# Patient Record
Sex: FEMALE | Race: OTHER | HISPANIC OR LATINO
[De-identification: names, ages, dates, MRNs, and addresses within clinical notes are randomized per-mention and may not be internally consistent; named-entity substitution may affect disease eponyms.]

---

## 2017-01-18 ENCOUNTER — APPOINTMENT (OUTPATIENT)
Dept: PEDIATRIC HEMATOLOGY/ONCOLOGY | Facility: CLINIC | Age: 27
End: 2017-01-18

## 2017-01-18 ENCOUNTER — LABORATORY RESULT (OUTPATIENT)
Age: 27
End: 2017-01-18

## 2017-01-18 VITALS
SYSTOLIC BLOOD PRESSURE: 109 MMHG | DIASTOLIC BLOOD PRESSURE: 74 MMHG | BODY MASS INDEX: 25.98 KG/M2 | HEIGHT: 62.87 IN | HEART RATE: 76 BPM | WEIGHT: 146.61 LBS

## 2017-01-18 DIAGNOSIS — Z86.39 PERSONAL HISTORY OF OTHER ENDOCRINE, NUTRITIONAL AND METABOLIC DISEASE: ICD-10-CM

## 2017-01-20 LAB
25(OH)D3 SERPL-MCNC: 20.6 NG/ML
ALBUMIN SERPL ELPH-MCNC: 4.7 G/DL
ALP BLD-CCNC: 66 U/L
ALT SERPL-CCNC: 15 U/L
ANION GAP SERPL CALC-SCNC: 14 MMOL/L
APPEARANCE: CLEAR
AST SERPL-CCNC: 24 U/L
BASOPHILS # BLD AUTO: 0.01 K/UL
BASOPHILS NFR BLD AUTO: 0.2 %
BILIRUB SERPL-MCNC: 0.5 MG/DL
BILIRUBIN URINE: NEGATIVE
BLOOD URINE: ABNORMAL
BUN SERPL-MCNC: 11 MG/DL
CALCIUM SERPL-MCNC: 9.4 MG/DL
CALCIUM SERPL-MCNC: 9.4 MG/DL
CHLORIDE SERPL-SCNC: 102 MMOL/L
CO2 SERPL-SCNC: 24 MMOL/L
COLOR: YELLOW
CREAT SERPL-MCNC: 0.89 MG/DL
EOSINOPHIL # BLD AUTO: 0.12 K/UL
EOSINOPHIL NFR BLD AUTO: 2.2 %
GLUCOSE QUALITATIVE U: NORMAL MG/DL
GLUCOSE SERPL-MCNC: 88 MG/DL
HCT VFR BLD CALC: 41.2 %
HGB BLD-MCNC: 13.6 G/DL
IMM GRANULOCYTES NFR BLD AUTO: 0.2 %
KETONES URINE: ABNORMAL
LEUKOCYTE ESTERASE URINE: NEGATIVE
LYMPHOCYTES # BLD AUTO: 1.42 K/UL
LYMPHOCYTES NFR BLD AUTO: 26.2 %
MAN DIFF?: NORMAL
MCHC RBC-ENTMCNC: 30 PG
MCHC RBC-ENTMCNC: 33 GM/DL
MCV RBC AUTO: 90.9 FL
MONOCYTES # BLD AUTO: 0.34 K/UL
MONOCYTES NFR BLD AUTO: 6.3 %
NEUTROPHILS # BLD AUTO: 3.53 K/UL
NEUTROPHILS NFR BLD AUTO: 64.9 %
NITRITE URINE: NEGATIVE
PARATHYROID HORMONE INTACT: 68 PG/ML
PH URINE: 6
PLATELET # BLD AUTO: 229 K/UL
POTASSIUM SERPL-SCNC: 3.8 MMOL/L
PROT SERPL-MCNC: 7.9 G/DL
PROTEIN URINE: NEGATIVE MG/DL
RBC # BLD: 4.53 M/UL
RBC # FLD: 13.6 %
SODIUM SERPL-SCNC: 140 MMOL/L
SPECIFIC GRAVITY URINE: 1.01
UROBILINOGEN URINE: NORMAL MG/DL
WBC # FLD AUTO: 5.43 K/UL

## 2017-02-08 ENCOUNTER — TRANSCRIPTION ENCOUNTER (OUTPATIENT)
Age: 27
End: 2017-02-08

## 2017-07-06 ENCOUNTER — APPOINTMENT (OUTPATIENT)
Dept: PSYCHIATRY | Facility: CLINIC | Age: 27
End: 2017-07-06

## 2017-10-20 ENCOUNTER — APPOINTMENT (OUTPATIENT)
Dept: PSYCHIATRY | Facility: CLINIC | Age: 27
End: 2017-10-20
Payer: MEDICAID

## 2017-10-20 PROCEDURE — 96118: CPT | Mod: NC

## 2017-11-17 ENCOUNTER — APPOINTMENT (OUTPATIENT)
Dept: PSYCHIATRY | Facility: CLINIC | Age: 27
End: 2017-11-17
Payer: MEDICAID

## 2017-11-17 PROCEDURE — 96118: CPT

## 2018-01-17 ENCOUNTER — APPOINTMENT (OUTPATIENT)
Dept: PEDIATRIC HEMATOLOGY/ONCOLOGY | Facility: CLINIC | Age: 28
End: 2018-01-17
Payer: MEDICAID

## 2018-01-17 VITALS
SYSTOLIC BLOOD PRESSURE: 105 MMHG | WEIGHT: 148.15 LBS | HEART RATE: 66 BPM | BODY MASS INDEX: 27.26 KG/M2 | HEIGHT: 61.77 IN | DIASTOLIC BLOOD PRESSURE: 69 MMHG

## 2018-01-17 PROCEDURE — 99215 OFFICE O/P EST HI 40 MIN: CPT

## 2018-01-22 LAB
25(OH)D3 SERPL-MCNC: 32.6 NG/ML
ALBUMIN SERPL ELPH-MCNC: 4.3 G/DL
ALP BLD-CCNC: 62 U/L
ALT SERPL-CCNC: 17 U/L
ANION GAP SERPL CALC-SCNC: 11 MMOL/L
APPEARANCE: ABNORMAL
AST SERPL-CCNC: 21 U/L
BASOPHILS # BLD AUTO: 0 K/UL
BASOPHILS NFR BLD AUTO: 0 %
BILIRUB SERPL-MCNC: 0.4 MG/DL
BILIRUBIN URINE: NEGATIVE
BLOOD URINE: NEGATIVE
BUN SERPL-MCNC: 18 MG/DL
CALCIUM SERPL-MCNC: 9.2 MG/DL
CALCIUM SERPL-MCNC: 9.2 MG/DL
CHLORIDE SERPL-SCNC: 104 MMOL/L
CHOLEST SERPL-MCNC: 112 MG/DL
CHOLEST/HDLC SERPL: 2.1 RATIO
CO2 SERPL-SCNC: 27 MMOL/L
COLOR: YELLOW
CREAT SERPL-MCNC: 0.68 MG/DL
EOSINOPHIL # BLD AUTO: 0.06 K/UL
EOSINOPHIL NFR BLD AUTO: 1.3 %
GLUCOSE BS SERPL-MCNC: 84 MG/DL
GLUCOSE QUALITATIVE U: NEGATIVE MG/DL
GLUCOSE SERPL-MCNC: 93 MG/DL
HBA1C MFR BLD HPLC: 5 %
HCT VFR BLD CALC: 42.9 %
HDLC SERPL-MCNC: 54 MG/DL
HGB BLD-MCNC: 13.6 G/DL
IMM GRANULOCYTES NFR BLD AUTO: 0.2 %
INSULIN P FAST SERPL-ACNC: 8.1 UU/ML
KETONES URINE: NEGATIVE
LDLC SERPL CALC-MCNC: 47 MG/DL
LEUKOCYTE ESTERASE URINE: NEGATIVE
LYMPHOCYTES # BLD AUTO: 1.3 K/UL
LYMPHOCYTES NFR BLD AUTO: 28 %
MAN DIFF?: NORMAL
MCHC RBC-ENTMCNC: 29.6 PG
MCHC RBC-ENTMCNC: 31.7 GM/DL
MCV RBC AUTO: 93.3 FL
MONOCYTES # BLD AUTO: 0.25 K/UL
MONOCYTES NFR BLD AUTO: 5.4 %
NEUTROPHILS # BLD AUTO: 3.03 K/UL
NEUTROPHILS NFR BLD AUTO: 65.1 %
NITRITE URINE: NEGATIVE
PARATHYROID HORMONE INTACT: 48 PG/ML
PH URINE: 6.5
PLATELET # BLD AUTO: 163 K/UL
POTASSIUM SERPL-SCNC: 5.1 MMOL/L
PROT SERPL-MCNC: 7.3 G/DL
PROTEIN URINE: NEGATIVE MG/DL
RBC # BLD: 4.6 M/UL
RBC # FLD: 13.9 %
SODIUM SERPL-SCNC: 142 MMOL/L
SPECIFIC GRAVITY URINE: 1.02
TRIGL SERPL-MCNC: 54 MG/DL
UROBILINOGEN URINE: NEGATIVE MG/DL
WBC # FLD AUTO: 4.65 K/UL

## 2018-08-25 ENCOUNTER — TRANSCRIPTION ENCOUNTER (OUTPATIENT)
Age: 28
End: 2018-08-25

## 2019-01-09 ENCOUNTER — NON-APPOINTMENT (OUTPATIENT)
Age: 29
End: 2019-01-09

## 2019-01-09 ENCOUNTER — OUTPATIENT (OUTPATIENT)
Dept: OUTPATIENT SERVICES | Facility: HOSPITAL | Age: 29
LOS: 1 days | End: 2019-01-09
Payer: MEDICAID

## 2019-01-09 ENCOUNTER — APPOINTMENT (OUTPATIENT)
Dept: PEDIATRIC HEMATOLOGY/ONCOLOGY | Facility: CLINIC | Age: 29
End: 2019-01-09
Payer: MEDICAID

## 2019-01-09 ENCOUNTER — APPOINTMENT (OUTPATIENT)
Dept: CARDIOLOGY | Facility: CLINIC | Age: 29
End: 2019-01-09
Payer: MEDICAID

## 2019-01-09 ENCOUNTER — APPOINTMENT (OUTPATIENT)
Dept: CV DIAGNOSITCS | Facility: HOSPITAL | Age: 29
End: 2019-01-09

## 2019-01-09 VITALS
DIASTOLIC BLOOD PRESSURE: 63 MMHG | SYSTOLIC BLOOD PRESSURE: 95 MMHG | HEART RATE: 78 BPM | BODY MASS INDEX: 28.38 KG/M2 | WEIGHT: 154 LBS | OXYGEN SATURATION: 99 %

## 2019-01-09 VITALS
SYSTOLIC BLOOD PRESSURE: 108 MMHG | BODY MASS INDEX: 27.65 KG/M2 | TEMPERATURE: 98.2 F | HEIGHT: 62.48 IN | HEART RATE: 81 BPM | WEIGHT: 154.1 LBS | DIASTOLIC BLOOD PRESSURE: 71 MMHG

## 2019-01-09 DIAGNOSIS — Z85.6 PERSONAL HISTORY OF LEUKEMIA: ICD-10-CM

## 2019-01-09 DIAGNOSIS — I25.10 ATHEROSCLEROTIC HEART DISEASE OF NATIVE CORONARY ARTERY WITHOUT ANGINA PECTORIS: ICD-10-CM

## 2019-01-09 PROCEDURE — 93306 TTE W/DOPPLER COMPLETE: CPT

## 2019-01-09 PROCEDURE — 93000 ELECTROCARDIOGRAM COMPLETE: CPT

## 2019-01-09 PROCEDURE — 0399T: CPT

## 2019-01-09 PROCEDURE — 99215 OFFICE O/P EST HI 40 MIN: CPT

## 2019-01-09 PROCEDURE — 99204 OFFICE O/P NEW MOD 45 MIN: CPT

## 2019-01-09 PROCEDURE — 93306 TTE W/DOPPLER COMPLETE: CPT | Mod: 26

## 2019-01-09 NOTE — HISTORY OF PRESENT ILLNESS
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  Suffers from PTSD and anxiety and follows with a therapist.  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 She denies CP or palpitations. She got off train for "fresh air" ate grapes and had water and felt better. she has had no other events since. \par \par She is anxious but has no complaints today.

## 2019-01-09 NOTE — ASSESSMENT
[FreeTextEntry1] : 29 yo F with history of ALL with anthracycline exposure now 16 weeks pregnant.\par presents for evaluation of dizziness. \par \par Vitals stable no orthostasis\par EKG normal\par Echo reviewed  me and was  normal with normal GLS and EF\par Advised increased hydration and occasional salt\par Very emotional during Visit- recommend follow up with therapist\par I would like a f/u visit and echo with strain in 3rd trimester given anthracycline exposure.

## 2019-01-09 NOTE — PHYSICAL EXAM
[General Appearance - Well Developed] : well developed [Normal Conjunctiva] : the conjunctiva exhibited no abnormalities [Eyelids - No Xanthelasma] : the eyelids demonstrated no xanthelasmas [Normal Oral Mucosa] : normal oral mucosa [No Oral Pallor] : no oral pallor [No Oral Cyanosis] : no oral cyanosis [Normal Jugular Venous A Waves Present] : normal jugular venous A waves present [Normal Jugular Venous V Waves Present] : normal jugular venous V waves present [No Jugular Venous Crespo A Waves] : no jugular venous crespo A waves [Heart Rate And Rhythm] : heart rate and rhythm were normal [Heart Sounds] : normal S1 and S2 [Murmurs] : no murmurs present [Respiration, Rhythm And Depth] : normal respiratory rhythm and effort [Exaggerated Use Of Accessory Muscles For Inspiration] : no accessory muscle use [Auscultation Breath Sounds / Voice Sounds] : lungs were clear to auscultation bilaterally [Abdomen Soft] : soft [Abdomen Tenderness] : non-tender [] : no hepato-splenomegaly [Abnormal Walk] : normal gait [Gait - Sufficient For Exercise Testing] : the gait was sufficient for exercise testing [Nail Clubbing] : no clubbing of the fingernails [Cyanosis, Localized] : no localized cyanosis [Petechial Hemorrhages (___cm)] : no petechial hemorrhages [Skin Color & Pigmentation] : normal skin color and pigmentation [No Venous Stasis] : no venous stasis [Oriented To Time, Place, And Person] : oriented to person, place, and time

## 2019-01-23 NOTE — CONSULT LETTER
[Dear  ___] : Dear  [unfilled], [Courtesy Letter:] : I had the pleasure of seeing your patient, [unfilled], in my office today. [Please see my note below.] : Please see my note below. [Sincerely,] : Sincerely, [FreeTextEntry2] : Miya Neely MD\par [address]\[place] [address]\par [phone] [FreeTextEntry1] : Melissa was seen for a follow up visit in the [facility] Forward Program at the [facility].\par  [FreeTextEntry3] : SHELLEY Warren\par [facility] \par [phone]

## 2019-01-23 NOTE — REVIEW OF SYSTEMS
[Negative] : Allergic/Immunologic [FreeTextEntry9] : Melissa described an episode of pinching sensation on the left side of her neck and left bicep while driving today.

## 2019-01-23 NOTE — PHYSICAL EXAM
[Tonsils Hypertrophic] : tonsils hypertrophic [Mediport] : Mediport [Scars ___] : scars [unfilled] [Gait normal] : gait normal [Normal] : affect appropriate [90: Able to carry normal activity; minor signs or symptoms of disease.] : 90: Able to carry normal activity; minor signs or symptoms of disease.  [de-identified] : wears glasses [de-identified] : +3 [de-identified] : scars [de-identified] : Bilateral chest mediport scars; abdominal laparoscopic scars well healed

## 2019-01-23 NOTE — HISTORY OF PRESENT ILLNESS
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 Since her last visit in the Survivorship program on January 17, 2018, she has been generally well without persistent fevers, bone pain or weight loss. \bhavesh Torres's post treatment course has been complicated by anxiety.  She had received mental health counseling last year but is not seeing anyone at this time.  Melissa would like to restart therapy. \bhavesh Torres is currently dating and is 16 weeks pregnant. This has brought significant emotional distress.  Melissa has been living at home with her parents and reported her boyfriend will be moving in with them soon.  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  \bhavesh Torres had struggled with weight issues in the past. She is no longer actively working out, but reported maintaining a healthy diet.\par She reported an episode of feeling faint [place] the subway. She reported exiting the train to catch her breath.  Recommended Melissa keep herself hydrated and to try and carry snacks with her at all times. Melissa was seen by Dr. Soria, cardiologist, and echocardiogram was normal.\par  [de-identified] : Teniposide: Yes [de-identified] : 180 mg/m2 [de-identified] : Yes [de-identified] : Yes [de-identified] : Yes [de-identified] : Yes [de-identified] : Yes [de-identified] : Yes [de-identified] : Yes [de-identified] : Yes [de-identified] : Yes

## 2019-04-03 ENCOUNTER — APPOINTMENT (OUTPATIENT)
Dept: CV DIAGNOSITCS | Facility: HOSPITAL | Age: 29
End: 2019-04-03

## 2019-04-03 ENCOUNTER — OUTPATIENT (OUTPATIENT)
Dept: OUTPATIENT SERVICES | Facility: HOSPITAL | Age: 29
LOS: 1 days | End: 2019-04-03
Payer: MEDICAID

## 2019-04-03 ENCOUNTER — APPOINTMENT (OUTPATIENT)
Dept: CARDIOLOGY | Facility: CLINIC | Age: 29
End: 2019-04-03
Payer: MEDICAID

## 2019-04-03 VITALS
SYSTOLIC BLOOD PRESSURE: 97 MMHG | WEIGHT: 166 LBS | BODY MASS INDEX: 29.79 KG/M2 | DIASTOLIC BLOOD PRESSURE: 64 MMHG | HEART RATE: 76 BPM | HEIGHT: 62.5 IN

## 2019-04-03 DIAGNOSIS — Z91.89 OTHER SPECIFIED PERSONAL RISK FACTORS, NOT ELSEWHERE CLASSIFIED: ICD-10-CM

## 2019-04-03 DIAGNOSIS — Z87.898 PERSONAL HISTORY OF OTHER SPECIFIED CONDITIONS: ICD-10-CM

## 2019-04-03 PROCEDURE — 93306 TTE W/DOPPLER COMPLETE: CPT | Mod: 26

## 2019-04-03 PROCEDURE — 93306 TTE W/DOPPLER COMPLETE: CPT

## 2019-04-03 PROCEDURE — 93356 MYOCRD STRAIN IMG SPCKL TRCK: CPT

## 2019-04-03 PROCEDURE — 99214 OFFICE O/P EST MOD 30 MIN: CPT

## 2019-04-03 PROCEDURE — 0399T: CPT

## 2019-04-03 NOTE — PHYSICAL EXAM
[General Appearance - Well Developed] : well developed [Normal Conjunctiva] : the conjunctiva exhibited no abnormalities [Eyelids - No Xanthelasma] : the eyelids demonstrated no xanthelasmas [Normal Oral Mucosa] : normal oral mucosa [No Oral Pallor] : no oral pallor [No Oral Cyanosis] : no oral cyanosis [Normal Jugular Venous A Waves Present] : normal jugular venous A waves present [Normal Jugular Venous V Waves Present] : normal jugular venous V waves present [No Jugular Venous Crespo A Waves] : no jugular venous crespo A waves [Respiration, Rhythm And Depth] : normal respiratory rhythm and effort [Exaggerated Use Of Accessory Muscles For Inspiration] : no accessory muscle use [Auscultation Breath Sounds / Voice Sounds] : lungs were clear to auscultation bilaterally [Heart Rate And Rhythm] : heart rate and rhythm were normal [Heart Sounds] : normal S1 and S2 [Murmurs] : no murmurs present [Abdomen Soft] : soft [Abdomen Tenderness] : non-tender [] : no hepato-splenomegaly [Abnormal Walk] : normal gait [Gait - Sufficient For Exercise Testing] : the gait was sufficient for exercise testing [Nail Clubbing] : no clubbing of the fingernails [Cyanosis, Localized] : no localized cyanosis [Petechial Hemorrhages (___cm)] : no petechial hemorrhages [Skin Color & Pigmentation] : normal skin color and pigmentation [No Venous Stasis] : no venous stasis [Oriented To Time, Place, And Person] : oriented to person, place, and time

## 2019-04-20 NOTE — ASSESSMENT
[FreeTextEntry1] : 29 yo F with history of ALL with anthracycline exposure now  pregnant. 28 weeks\par w/u for DVT negative \par \par Vitals stable \par EKG normal\par Echo reviewed normal with normal GLS and EF \par f/u 4 weeks\par \par

## 2019-04-20 NOTE — HISTORY OF PRESENT ILLNESS
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  Suffers from PTSD and anxiety and follows with a therapist.  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 She denies CP or palpitations. She got off train for "fresh air" ate grapes and had water and felt better. she has had no other events since. \par \par \par Interval follow up 4/3/19\par c/o right calf pain --> ER r/o dvt on 3/30/19. No US avail at [facility] so had lovenox 75mg  x3 q12, us monday negative for DVT. OB aware. will OB today [facility]\par \par medication on PNV\par compliant with diet\par \par plan ob vaginal delivery \par now 28 weeks due june 20 2019\par \par ROS: Denies Chest pain, dyspnea, palpitations, dizziness or syncope.\par

## 2019-05-22 ENCOUNTER — NON-APPOINTMENT (OUTPATIENT)
Age: 29
End: 2019-05-22

## 2019-05-22 ENCOUNTER — APPOINTMENT (OUTPATIENT)
Dept: CARDIOLOGY | Facility: CLINIC | Age: 29
End: 2019-05-22
Payer: MEDICAID

## 2019-05-22 ENCOUNTER — APPOINTMENT (OUTPATIENT)
Dept: CV DIAGNOSITCS | Facility: HOSPITAL | Age: 29
End: 2019-05-22

## 2019-05-22 ENCOUNTER — OUTPATIENT (OUTPATIENT)
Dept: OUTPATIENT SERVICES | Facility: HOSPITAL | Age: 29
LOS: 1 days | End: 2019-05-22
Payer: MEDICAID

## 2019-05-22 VITALS
WEIGHT: 175 LBS | BODY MASS INDEX: 31.5 KG/M2 | SYSTOLIC BLOOD PRESSURE: 94 MMHG | HEART RATE: 73 BPM | OXYGEN SATURATION: 97 % | DIASTOLIC BLOOD PRESSURE: 59 MMHG

## 2019-05-22 DIAGNOSIS — I10 ESSENTIAL (PRIMARY) HYPERTENSION: ICD-10-CM

## 2019-05-22 PROCEDURE — 93306 TTE W/DOPPLER COMPLETE: CPT

## 2019-05-22 PROCEDURE — 99215 OFFICE O/P EST HI 40 MIN: CPT

## 2019-05-22 PROCEDURE — 0399T: CPT

## 2019-05-22 PROCEDURE — 93000 ELECTROCARDIOGRAM COMPLETE: CPT

## 2019-05-22 PROCEDURE — 93356 MYOCRD STRAIN IMG SPCKL TRCK: CPT

## 2019-05-22 PROCEDURE — 93306 TTE W/DOPPLER COMPLETE: CPT | Mod: 26

## 2019-06-09 NOTE — ASSESSMENT
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  She is currently 35 weeks pregnant.\par doing well  NO Chest pain, dyspnea, palpitations, dizziness or syncope.\par \par \par -BP today is mildly low, without symptoms\par -Encouraging patient to increase hydration\par -EKG is in sinus rhythm, stable and unchanged\par -Echo today demonstrated normal LV function, GLS -16.5 (likely due to increased volume in pregnacy \par -Advised patient to call cardiology if indicated  \par -Due date June 20, 2019.\par -Post delivery, patient will be seen in follow up 1-2 mths after

## 2019-06-09 NOTE — HISTORY OF PRESENT ILLNESS
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  Suffers from PTSD and anxiety and follows with a therapist.  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 She denies CP or palpitations. She got off train for "fresh air" ate grapes and had water and felt better. she has had no other events since. \par \par \par Interval follow up 4/3/19\par c/o right calf pain --> ER r/o dvt on 3/30/19. No US avail at [facility] so had lovenox 75mg  x3 q12, us monday negative for DVT. OB aware. will OB today [facility]\par \par medication on PNV\par compliant with diet\par \par plan ob vaginal delivery \par now 28 weeks due june 20 2019\par \par ROS: Denies Chest pain, dyspnea, palpitations, dizziness or syncope.\par \par Interval Note\par May 22, 2019\par \par Patient returns for her final cardiac visit prior to delivery. She is currently 35 weeks pregnant. Blood pressure today is stable. EKG is in sinus rhythm, stable and unchanged.\par \par She denies any shortness of breath, dizziness, palpitations.\par \par Most recent echocardiogram was performed today \par \par \par

## 2019-08-25 ENCOUNTER — TRANSCRIPTION ENCOUNTER (OUTPATIENT)
Age: 29
End: 2019-08-25

## 2019-12-23 ENCOUNTER — APPOINTMENT (OUTPATIENT)
Dept: PEDIATRIC HEMATOLOGY/ONCOLOGY | Facility: CLINIC | Age: 29
End: 2019-12-23
Payer: MEDICAID

## 2019-12-23 VITALS
DIASTOLIC BLOOD PRESSURE: 70 MMHG | HEART RATE: 79 BPM | HEIGHT: 62.52 IN | WEIGHT: 166.89 LBS | TEMPERATURE: 97.5 F | BODY MASS INDEX: 29.94 KG/M2 | SYSTOLIC BLOOD PRESSURE: 104 MMHG

## 2019-12-23 DIAGNOSIS — Z78.9 OTHER SPECIFIED HEALTH STATUS: ICD-10-CM

## 2019-12-23 PROCEDURE — 99214 OFFICE O/P EST MOD 30 MIN: CPT

## 2019-12-23 RX ORDER — CALCIUM CARBONATE/VITAMIN D3 600 MG-10
TABLET ORAL
Refills: 0 | Status: DISCONTINUED | COMMUNITY
End: 2019-12-23

## 2019-12-23 NOTE — PAST MEDICAL HISTORY
[Definite:  ___ (Date)] : the last menstrual period was [unfilled] [Regular Cycle Intervals] : periods have been regular [Menarche Age: ____] : age at menarche was [unfilled]

## 2019-12-23 NOTE — SOCIAL HISTORY
[College] : College [Sexually Active] : sexually active [Condoms] : condoms [Number of Partners ___] : with [unfilled]  partner(s)

## 2020-01-03 NOTE — PHYSICAL EXAM
[Mediport] : Mediport [Normal] : affect appropriate [Scars ___] : scars [unfilled] [90: Able to carry normal activity; minor signs or symptoms of disease.] : 90: Able to carry normal activity; minor signs or symptoms of disease.  [de-identified] : Wears glasses [de-identified] : Scars [de-identified] : Right and left chest mediport scars

## 2020-01-03 NOTE — REVIEW OF SYSTEMS
[Anxiety] : anxiety [Depression] : depression [Negative] : Heme/Lymph [FreeTextEntry9] : mid scapular pain.

## 2020-01-03 NOTE — CONSULT LETTER
[Please see my note below.] : Please see my note below. [Courtesy Letter:] : I had the pleasure of seeing your patient, [unfilled], in my office today. [Dear  ___] : Dear  [unfilled], [Sincerely,] : Sincerely, [FreeTextEntry2] : Ankit Abarca MD\par [address], \par [place] [address]\par [phone]\par \par  [FreeTextEntry1] : Melissa was seen for a follow up visit in the Survivors [facility]acing Forward Program at the [facility].\par  [FreeTextEntry3] : SHELLEY Warren\par [facility] \par [phone]

## 2020-01-03 NOTE — HISTORY OF PRESENT ILLNESS
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 Since her last visit in the Survivorship program on January 9, 2019, she has been generally well without persistent fevers, bone pain or weight loss.  Melissa's post treatment course has been complicated by anxiety, depression and she was recently diagnosed with adjustment disorder.\par \par Melissa reported seeing a therapist once per week. She reported seeing the therapist for increased anger concerns and increase worry.  Melissa was very tearful at this visit. Provided reassurance and positive feedback about her recent accomplishments.  Melissa has been living at home with her 6 month old daughter, her boyfriend, parents.  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  \par Melissa reported minimal exercise, but reported maintaining a healthy diet.\par She reported experiencing back pain, which is attributed to poor ergonomics. She reported going for PT. [de-identified] : Teniposide: Yes [de-identified] : 180 mg/m2 [de-identified] : Yes [de-identified] : Yes [de-identified] : Yes [de-identified] : Yes [de-identified] : Yes [de-identified] : Yes [de-identified] : Yes [de-identified] : Yes [de-identified] : Yes

## 2020-01-06 LAB
ALBUMIN SERPL ELPH-MCNC: 4.5 G/DL
ALP BLD-CCNC: 82 U/L
ALT SERPL-CCNC: 12 U/L
ANION GAP SERPL CALC-SCNC: 10 MMOL/L
APPEARANCE: CLEAR
AST SERPL-CCNC: 16 U/L
BASOPHILS # BLD AUTO: 0.05 K/UL
BASOPHILS NFR BLD AUTO: 0.6 %
BILIRUB SERPL-MCNC: 0.2 MG/DL
BILIRUBIN URINE: NEGATIVE
BLOOD URINE: NEGATIVE
BUN SERPL-MCNC: 15 MG/DL
CALCIUM SERPL-MCNC: 9.7 MG/DL
CHLORIDE SERPL-SCNC: 102 MMOL/L
CHOLEST SERPL-MCNC: 137 MG/DL
CHOLEST/HDLC SERPL: 2.6 RATIO
CO2 SERPL-SCNC: 25 MMOL/L
COLOR: NORMAL
CREAT SERPL-MCNC: 0.55 MG/DL
EOSINOPHIL # BLD AUTO: 0.35 K/UL
EOSINOPHIL NFR BLD AUTO: 4.5 %
ESTIMATED AVERAGE GLUCOSE: 100 MG/DL
GLUCOSE QUALITATIVE U: NEGATIVE
GLUCOSE SERPL-MCNC: 92 MG/DL
HBA1C MFR BLD HPLC: 5.1 %
HCT VFR BLD CALC: 41.5 %
HDLC SERPL-MCNC: 53 MG/DL
HGB BLD-MCNC: 13.6 G/DL
IMM GRANULOCYTES NFR BLD AUTO: 0.5 %
KETONES URINE: NEGATIVE
LDLC SERPL CALC-MCNC: 48 MG/DL
LEUKOCYTE ESTERASE URINE: NEGATIVE
LYMPHOCYTES # BLD AUTO: 1.89 K/UL
LYMPHOCYTES NFR BLD AUTO: 24.4 %
MAN DIFF?: NORMAL
MCHC RBC-ENTMCNC: 29.8 PG
MCHC RBC-ENTMCNC: 32.8 GM/DL
MCV RBC AUTO: 90.8 FL
MONOCYTES # BLD AUTO: 0.36 K/UL
MONOCYTES NFR BLD AUTO: 4.7 %
NEUTROPHILS # BLD AUTO: 5.05 K/UL
NEUTROPHILS NFR BLD AUTO: 65.3 %
NITRITE URINE: NEGATIVE
PH URINE: 6
PLATELET # BLD AUTO: 257 K/UL
POTASSIUM SERPL-SCNC: 4.3 MMOL/L
PROT SERPL-MCNC: 7.1 G/DL
PROTEIN URINE: NEGATIVE
RBC # BLD: 4.57 M/UL
RBC # FLD: 12.7 %
SODIUM SERPL-SCNC: 137 MMOL/L
SPECIFIC GRAVITY URINE: 1.02
TRIGL SERPL-MCNC: 182 MG/DL
UROBILINOGEN URINE: NORMAL
WBC # FLD AUTO: 7.74 K/UL

## 2020-02-07 ENCOUNTER — TRANSCRIPTION ENCOUNTER (OUTPATIENT)
Age: 30
End: 2020-02-07

## 2020-04-20 ENCOUNTER — APPOINTMENT (OUTPATIENT)
Dept: OTOLARYNGOLOGY | Facility: CLINIC | Age: 30
End: 2020-04-20

## 2020-05-05 ENCOUNTER — APPOINTMENT (OUTPATIENT)
Dept: NEUROLOGY | Facility: CLINIC | Age: 30
End: 2020-05-05
Payer: MEDICAID

## 2020-05-05 ENCOUNTER — TRANSCRIPTION ENCOUNTER (OUTPATIENT)
Age: 30
End: 2020-05-05

## 2020-05-05 DIAGNOSIS — Z82.49 FAMILY HISTORY OF ISCHEMIC HEART DISEASE AND OTHER DISEASES OF THE CIRCULATORY SYSTEM: ICD-10-CM

## 2020-05-05 PROCEDURE — 99204 OFFICE O/P NEW MOD 45 MIN: CPT | Mod: 95

## 2020-05-05 NOTE — PHYSICAL EXAM
[Person] : oriented to person [Place] : oriented to place [Time] : oriented to time [Short Term Intact] : short term memory intact [Fluency] : fluency intact [Current Events] : adequate knowledge of current events [Cranial Nerves Oculomotor (III)] : extraocular motion intact [Cranial Nerves Facial (VII)] : face symmetrical [Cranial Nerves Accessory (XI - Cranial And Spinal)] : head turning and shoulder shrug symmetric [Cranial Nerves Vestibulocochlear (VIII)] : hearing was intact bilaterally [Cranial Nerves Hypoglossal (XII)] : there was no tongue deviation with protrusion [Abnormal Walk] : normal gait [Paresis Pronator Drift Left-Sided] : no pronator drift on the left [Paresis Pronator Drift Right-Sided] : no pronator drift on the right [Coordination - Dysmetria Impaired Finger-to-Nose Bilateral] : not present

## 2020-05-05 NOTE — DISCUSSION/SUMMARY
[FreeTextEntry1] : 29 W who has history of childhood ALL s/p chemo who is seen for new onset of headache. Will check mri of brain w and w/o gado, mrv for signs of venous sinus thrombosis/ malignancy. She was offered but declined other medication at this time. check bun and creat. She was also advised to practice stress management technique, obtain better sleep schedule if possible. SHe was asked to obtain bp machine and keep a bp journal. Fu after studies are completed.\par \par physician location: home\par patient location: home\par \par I spent 45 minutes of face to face time, during which more than 50% of the time was spent on counseling. I explained the diagnosis, other possible diagnoses, workup, and management, as well as answered any questions.\par \par This is a telemedicine visit that was performed using real time 2-way audiovisual technology. Verbal consent to participate in video visit was obtained and patient was aware of their right to refuse to participate in services delivered via telemedicine and the alternative and potential limitations of participating in a telemedicine visit vs a face-to-face visit; I have also informed the patient of my current location in the [facility] and the names of all persons participating in the telemedicine service and their role in the encounter. The patient agrees to have this service via Telehealth.\par \par  This visit occurred during the Coronavirus (COVID-19) Public Health Emergency. I discussed with the patient the nature of our telemedicine visits, that: \par • I would evaluate the patient and recommend diagnostics and treatments based on my assessment \par • Our sessions are not being recorded and that personal health information is protected \par • Our team would provide follow up care in person if/when the patient needs it.\par

## 2020-05-05 NOTE — HISTORY OF PRESENT ILLNESS
[FreeTextEntry1] : verbal consent given on 05/05/2020 and 09:25  by CARLO WALTERS at [address]\[place] [address]\par \par 29 W hx of ALL in childhood is here for initial consultation of new onset headche that initially presented in Jan. She saw ENT and was told she has clear sinuses. For the past 3 weeks, she has had the headache that involves the right side of her head. There's associated nausea and vision changes. It is worse when she bends forward. She tried muscle relaxers for 5 nights and it helps. She checked her bp and it was 103/58. She can't remember if she was feeling symptomatic. She also has jaw tension omaira upon awakening in the morning. She has  pending with her dentist. She will also start seeing her therapist as she is under a lot of stress. She can't sleep through the night because her daughter is teething. \par \par CONSENT FOR VIDEO MEDICAL VISIT1. I understand that my physician wishes me to engage in a telemedicine consultation.2. My physician has explained to me how the video conferencing technology will be used to affect such a consultation will not be the same as a direct patient/health care provider visit due to the fact that I will not be in the same room as my physician 3. I understand there are potential risks to this technology, including interruptions, unauthorized access and technical difficulties. I understand that my health care provider or I can discontinue the telemedicine consult/visit if it is felt that the videoconferencing connections are not adequate for the situation.4. I understand that my healthcare information may be shared with other individuals for scheduling and billing purposes. Others may also be present during the consultation other than my physician and consulting health care provider in order to operate the video equipment. The above mentioned people will all maintain confidentiality of the information obtained. I further understand that I will be informed of their presence in the consultation and thus will have the right to request the following: (1) omit specific details of my medical history/physical examination that are personally sensitive to me; (2) ask non-medical personnel to leave the telemedicine examination room: and or (3) terminate the consultation at any time.5. I have had the alternatives to a telemedicine consultation explained to me, and in choosing to participate in a telemedicine consultation. I understand that some parts of the exam involving physical tests may be conducted by individuals at my location at the direction of the consulting health care provider.6. In an emergent consultation, I understand that the responsibility of the telemedicine consulting specialist is to advise my local practitioner and that the specialist’s responsibility will conclude upon the termination of the video conference connection.7. I understand that billing will occur from both my physician and as a facility fee from the site from which I am presented.8. I have had a direct conversation with my physician, during which I had the opportunity to ask questions in regard to this procedure. My questions have been answered and the risks, benefits and any practical alternatives have been discussed with me in a language in which I understand\par

## 2020-05-19 LAB
BUN SERPL-MCNC: 9 MG/DL
CREAT SERPL-MCNC: 0.61 MG/DL

## 2020-05-29 ENCOUNTER — APPOINTMENT (OUTPATIENT)
Dept: MRI IMAGING | Facility: IMAGING CENTER | Age: 30
End: 2020-05-29

## 2020-05-29 ENCOUNTER — OUTPATIENT (OUTPATIENT)
Dept: OUTPATIENT SERVICES | Facility: HOSPITAL | Age: 30
LOS: 1 days | End: 2020-05-29
Payer: MEDICAID

## 2020-05-29 ENCOUNTER — RESULT REVIEW (OUTPATIENT)
Age: 30
End: 2020-05-29

## 2020-05-29 DIAGNOSIS — Z85.6 PERSONAL HISTORY OF LEUKEMIA: ICD-10-CM

## 2020-05-29 DIAGNOSIS — R51 HEADACHE: ICD-10-CM

## 2020-05-29 PROCEDURE — 70553 MRI BRAIN STEM W/O & W/DYE: CPT | Mod: 26

## 2020-05-29 PROCEDURE — 70553 MRI BRAIN STEM W/O & W/DYE: CPT

## 2020-05-29 PROCEDURE — 70544 MR ANGIOGRAPHY HEAD W/O DYE: CPT

## 2020-05-29 PROCEDURE — A9585: CPT

## 2020-05-29 PROCEDURE — 70544 MR ANGIOGRAPHY HEAD W/O DYE: CPT | Mod: 26,59

## 2020-06-03 ENCOUNTER — APPOINTMENT (OUTPATIENT)
Dept: NEUROLOGY | Facility: CLINIC | Age: 30
End: 2020-06-03
Payer: MEDICAID

## 2020-06-03 PROCEDURE — 99214 OFFICE O/P EST MOD 30 MIN: CPT | Mod: 95

## 2020-06-03 NOTE — HISTORY OF PRESENT ILLNESS
[FreeTextEntry1] : verbal consent given on 06/03/2020 and 13:30  by CARLOTY WALTERS at [address]\[place] [address]\par \par 29 W who is here for followup of her tension headaches. We went over headaches. She states that since her last visit, her headaches improved and when she heard devastating news about the death of a family friend, it returned. \par \par CONSENT FOR VIDEO MEDICAL VISIT1. I understand that my physician wishes me to engage in a telemedicine consultation.2. My physician has explained to me how the video conferencing technology will be used to affect such a consultation will not be the same as a direct patient/health care provider visit due to the fact that I will not be in the same room as my physician 3. I understand there are potential risks to this technology, including interruptions, unauthorized access and technical difficulties. I understand that my health care provider or I can discontinue the telemedicine consult/visit if it is felt that the videoconferencing connections are not adequate for the situation.4. I understand that my healthcare information may be shared with other individuals for scheduling and billing purposes. Others may also be present during the consultation other than my physician and consulting health care provider in order to operate the video equipment. The above mentioned people will all maintain confidentiality of the information obtained. I further understand that I will be informed of their presence in the consultation and thus will have the right to request the following: (1) omit specific details of my medical history/physical examination that are personally sensitive to me; (2) ask non-medical personnel to leave the telemedicine examination room: and or (3) terminate the consultation at any time.5. I have had the alternatives to a telemedicine consultation explained to me, and in choosing to participate in a telemedicine consultation. I understand that some parts of the exam involving physical tests may be conducted by individuals at my location at the direction of the consulting health care provider.6. In an emergent consultation, I understand that the responsibility of the telemedicine consulting specialist is to advise my local practitioner and that the specialist’s responsibility will conclude upon the termination of the video conference connection.7. I understand that billing will occur from both my physician and as a facility fee from the site from which I am presented.8. I have had a direct conversation with my physician, during which I had the opportunity to ask questions in regard to this procedure. My questions have been answered and the risks, benefits and any practical alternatives have been discussed with me in a language in which I understand\par

## 2020-06-03 NOTE — PHYSICAL EXAM
[Person] : oriented to person [Time] : oriented to time [Place] : oriented to place [Short Term Intact] : short term memory intact [Fluency] : fluency intact [Current Events] : adequate knowledge of current events [Cranial Nerves Oculomotor (III)] : extraocular motion intact [Cranial Nerves Vestibulocochlear (VIII)] : hearing was intact bilaterally [Cranial Nerves Facial (VII)] : face symmetrical [Cranial Nerves Hypoglossal (XII)] : there was no tongue deviation with protrusion [Cranial Nerves Accessory (XI - Cranial And Spinal)] : head turning and shoulder shrug symmetric [Paresis Pronator Drift Left-Sided] : no pronator drift on the left [Paresis Pronator Drift Right-Sided] : no pronator drift on the right [Abnormal Walk] : normal gait [Coordination - Dysmetria Impaired Finger-to-Nose Bilateral] : not present

## 2020-06-03 NOTE — DISCUSSION/SUMMARY
[FreeTextEntry1] : 29 W who likely has tension headaches. We went over MRi/a of brain. She will continue to keep journal of her headaches. She will fu if there's any change in her headaches.\par \par physician location: home\par patient location: home\par \par I spent 25 minutes of total time, during which more than 50% of the time was spent on counseling. I explained the diagnosis, other possible diagnoses, workup, and management, as well as answered any questions.\par \par This is a telemedicine visit that was performed using real time 2-way audiovisual technology. Verbal consent to participate in video visit was obtained and patient was aware of their right to refuse to participate in services delivered via telemedicine and the alternative and potential limitations of participating in a telemedicine visit vs a face-to-face visit; I have also informed the patient of my current location in the [facility] and the names of all persons participating in the telemedicine service and their role in the encounter. The patient agrees to have this service via Telehealth.\par \par  This visit occurred during the Coronavirus (COVID-19) Public Health Emergency. I discussed with the patient the nature of our telemedicine visits, that: \par • I would evaluate the patient and recommend diagnostics and treatments based on my assessment \par • Our sessions are not being recorded and that personal health information is protected \par • Our team would provide follow up care in person if/when the patient needs it.\par

## 2021-02-08 ENCOUNTER — NON-APPOINTMENT (OUTPATIENT)
Age: 31
End: 2021-02-08

## 2021-02-08 ENCOUNTER — APPOINTMENT (OUTPATIENT)
Dept: PEDIATRIC HEMATOLOGY/ONCOLOGY | Facility: CLINIC | Age: 31
End: 2021-02-08

## 2021-03-29 ENCOUNTER — APPOINTMENT (OUTPATIENT)
Dept: PEDIATRIC HEMATOLOGY/ONCOLOGY | Facility: CLINIC | Age: 31
End: 2021-03-29
Payer: MEDICAID

## 2021-03-29 VITALS
HEIGHT: 62.4 IN | HEART RATE: 96 BPM | SYSTOLIC BLOOD PRESSURE: 101 MMHG | DIASTOLIC BLOOD PRESSURE: 65 MMHG | BODY MASS INDEX: 28.72 KG/M2 | WEIGHT: 158.07 LBS | TEMPERATURE: 97.7 F

## 2021-03-29 DIAGNOSIS — Z87.19 PERSONAL HISTORY OF OTHER DISEASES OF THE DIGESTIVE SYSTEM: ICD-10-CM

## 2021-03-29 DIAGNOSIS — F41.9 ANXIETY DISORDER, UNSPECIFIED: ICD-10-CM

## 2021-03-29 PROCEDURE — 99214 OFFICE O/P EST MOD 30 MIN: CPT

## 2021-03-29 PROCEDURE — 99072 ADDL SUPL MATRL&STAF TM PHE: CPT

## 2021-03-30 NOTE — CONSULT LETTER
[Dear  ___] : Dear  [unfilled], [Courtesy Letter:] : I had the pleasure of seeing your patient, [unfilled], in my office today. [Please see my note below.] : Please see my note below. [Consult Closing:] : Thank you very much for allowing me to participate in the care of this patient.  If you have any questions, please do not hesitate to contact me. [Sincerely,] : Sincerely, [FreeTextEntry3] : SHELLEY Cook\par Family Nurse Practitioner \par [facility] \par Pediatric Hematology/Oncology\par Survivors Facing Forward Program\par [email or web address]\par [phone]\par \par \par   \par \par \par

## 2021-03-30 NOTE — SOCIAL HISTORY
[Mother] : mother [Father] : father [Spouse/Partner] : spouse/partner [Sexually Active] : sexually active [de-identified] : daughter

## 2021-03-30 NOTE — HISTORY OF PRESENT ILLNESS
[de-identified] : History of SR Pre B-cell Acute Lymphocystic Leukemia\par Protocol  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 Since her last visit in the survivorship program, 12/23/2019, MELISSA has been well without any visits to the emergency room, hospitalizations or surgeries. She has not had persistent fevers, weight loss or night sweats. Melissa reported to be 12 wks pregnant and reports morning sickness. She has established OB care with [facility] in [place], blood work and other labs were done per MELISSA and she is started on prenatal vitamins and antiemetics. MELISSA will obtain the results of the blood work and urine and send us for our reference. Melissa has temporarily stopped seeing a therapist, due to her therapist moving out of the clinic. She plans to seek out an appointment with another provider.\par \par MELISSA has been living at home with her family, her parents, boyfriend and her 18 months daughter. She has resumed working as a dental assistant. She reports trying to generally having a healthy diet.  \par \par

## 2021-03-30 NOTE — PHYSICAL EXAM
[Cervical Lymph Nodes Enlarged Posterior Bilaterally] : posterior cervical [Supraclavicular Lymph Nodes Enlarged Bilaterally] : supraclavicular [Cervical Lymph Nodes Enlarged Anterior Bilaterally] : anterior cervical [Normal] : normal appearance, no rash, nodules, vesicles, ulcers, erythema [100: Normal, no complaints, no evidence of disease.] : 100: Normal, no complaints, no evidence of disease. [de-identified] : b

## 2021-03-30 NOTE — REVIEW OF SYSTEMS
[Anxiety] : anxiety [Negative] : Heme/Lymph [Suicidal] : not suicidal [Poor Sleep] : no poor sleep [Depression] : no depression [FreeTextEntry8] : nausea, secondary to pregnancy [de-identified] : anger issues

## 2021-05-12 ENCOUNTER — APPOINTMENT (OUTPATIENT)
Dept: CARDIOLOGY | Facility: CLINIC | Age: 31
End: 2021-05-12
Payer: MEDICAID

## 2021-05-12 ENCOUNTER — APPOINTMENT (OUTPATIENT)
Dept: CV DIAGNOSITCS | Facility: HOSPITAL | Age: 31
End: 2021-05-12

## 2021-05-12 ENCOUNTER — NON-APPOINTMENT (OUTPATIENT)
Age: 31
End: 2021-05-12

## 2021-05-12 ENCOUNTER — OUTPATIENT (OUTPATIENT)
Dept: OUTPATIENT SERVICES | Facility: HOSPITAL | Age: 31
LOS: 1 days | End: 2021-05-12
Payer: MEDICAID

## 2021-05-12 VITALS — HEART RATE: 74 BPM | DIASTOLIC BLOOD PRESSURE: 61 MMHG | OXYGEN SATURATION: 98 % | SYSTOLIC BLOOD PRESSURE: 94 MMHG

## 2021-05-12 DIAGNOSIS — Z92.21 PERSONAL HISTORY OF ANTINEOPLASTIC CHEMOTHERAPY: ICD-10-CM

## 2021-05-12 PROCEDURE — 99214 OFFICE O/P EST MOD 30 MIN: CPT

## 2021-05-12 PROCEDURE — 93356 MYOCRD STRAIN IMG SPCKL TRCK: CPT

## 2021-05-12 PROCEDURE — 93306 TTE W/DOPPLER COMPLETE: CPT | Mod: 26

## 2021-05-12 PROCEDURE — 93000 ELECTROCARDIOGRAM COMPLETE: CPT

## 2021-05-12 PROCEDURE — 93306 TTE W/DOPPLER COMPLETE: CPT

## 2021-05-12 NOTE — ASSESSMENT
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  She is currently 35 weeks pregnant.\par doing well  NO Chest pain, dyspnea, palpitations, dizziness or syncope.\par \par \par -BP today is mildly low, without symptoms\par -Encouraging patient to increase hydration\par -EKG is in sinus rhythm, stable and unchanged\par -Echo today demonstrated normal LV function, GLS -19.2 \par --Due date October 11, 2021; LMP January 8, 2021\par -

## 2021-05-12 NOTE — HISTORY OF PRESENT ILLNESS
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  Suffers from PTSD and anxiety and follows with a therapist.  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 She denies CP or palpitations. She got off train for "fresh air" ate grapes and had water and felt better. she has had no other events since. \par \par \par Interval follow up 4/3/19\par c/o right calf pain --> ER r/o dvt on 3/30/19. No US avail at [facility] so had lovenox 75mg  x3 q12, us monday negative for DVT. OB aware. will OB [facility]\par \par medication on PNV\par compliant with diet\par \par plan ob vaginal delivery \par now 28 weeks due 2019\par \par ROS: Denies Chest pain, dyspnea, palpitations, dizziness or syncope.\par \par Interval Note\par May 22, 2019\par \par Patient returns for her final cardiac visit prior to delivery. She is currently 35 weeks pregnant. Blood pressure today is stable. EKG is in sinus rhythm, stable and unchanged.\par \par She denies any shortness of breath, dizziness, palpitations.\par \par Most recent echocardiogram was performed today \par \par Interval Note\par May 12, 2021\par \par Patient returns after a long hiatus. She delivered her first child on 2019, full term,  without complications.\par Currently, patient presents once again 18 weeks pregnant. \par Blood pressure today is low: 94/61. She reports that she has had anything to eat or drink today except coffee-> encouraged her to hydrate.\par EKG- sinus rhythm, stable and unchanged.\par \par Repeat echocardiogram today demonstrated: \par Normal LV function\par LVEF 61%\par Global longitudinal strain- 19.2%\par \par Denies issues with shortness of breath, palpitations or chest discomfort.\par \par \par

## 2021-05-12 NOTE — PHYSICAL EXAM
[General Appearance - Well Developed] : well developed [Normal Conjunctiva] : the conjunctiva exhibited no abnormalities [Eyelids - No Xanthelasma] : the eyelids demonstrated no xanthelasmas [Normal Oral Mucosa] : normal oral mucosa [No Oral Pallor] : no oral pallor [No Oral Cyanosis] : no oral cyanosis [Normal Jugular Venous A Waves Present] : normal jugular venous A waves present [Normal Jugular Venous V Waves Present] : normal jugular venous V waves present [No Jugular Venous Crespo A Waves] : no jugular venous crespo A waves [Respiration, Rhythm And Depth] : normal respiratory rhythm and effort [Auscultation Breath Sounds / Voice Sounds] : lungs were clear to auscultation bilaterally [Exaggerated Use Of Accessory Muscles For Inspiration] : no accessory muscle use [Heart Rate And Rhythm] : heart rate and rhythm were normal [Heart Sounds] : normal S1 and S2 [Murmurs] : no murmurs present [Abdomen Soft] : soft [Abdomen Tenderness] : non-tender [] : no hepato-splenomegaly [Abnormal Walk] : normal gait [Gait - Sufficient For Exercise Testing] : the gait was sufficient for exercise testing [Nail Clubbing] : no clubbing of the fingernails [Cyanosis, Localized] : no localized cyanosis [Petechial Hemorrhages (___cm)] : no petechial hemorrhages [No Venous Stasis] : no venous stasis [Skin Color & Pigmentation] : normal skin color and pigmentation [Oriented To Time, Place, And Person] : oriented to person, place, and time [Normal] : normal [Normal Rate] : normal [Normal S1] : normal S1 [Normal S2] : normal S2 [No Murmur] : no murmurs heard [No Pitting Edema] : no pitting edema present

## 2021-08-04 ENCOUNTER — APPOINTMENT (OUTPATIENT)
Dept: CV DIAGNOSITCS | Facility: HOSPITAL | Age: 31
End: 2021-08-04

## 2021-08-04 ENCOUNTER — NON-APPOINTMENT (OUTPATIENT)
Age: 31
End: 2021-08-04

## 2021-08-04 ENCOUNTER — APPOINTMENT (OUTPATIENT)
Dept: CARDIOLOGY | Facility: CLINIC | Age: 31
End: 2021-08-04
Payer: MEDICAID

## 2021-08-04 ENCOUNTER — OUTPATIENT (OUTPATIENT)
Dept: OUTPATIENT SERVICES | Facility: HOSPITAL | Age: 31
LOS: 1 days | End: 2021-08-04
Payer: MEDICAID

## 2021-08-04 VITALS
HEIGHT: 62 IN | HEART RATE: 91 BPM | SYSTOLIC BLOOD PRESSURE: 97 MMHG | BODY MASS INDEX: 32.2 KG/M2 | DIASTOLIC BLOOD PRESSURE: 64 MMHG | WEIGHT: 175 LBS | OXYGEN SATURATION: 99 %

## 2021-08-04 DIAGNOSIS — Z91.89 OTHER SPECIFIED PERSONAL RISK FACTORS, NOT ELSEWHERE CLASSIFIED: ICD-10-CM

## 2021-08-04 DIAGNOSIS — Z92.21 PERSONAL HISTORY OF ANTINEOPLASTIC CHEMOTHERAPY: ICD-10-CM

## 2021-08-04 PROCEDURE — 93356 MYOCRD STRAIN IMG SPCKL TRCK: CPT

## 2021-08-04 PROCEDURE — 93000 ELECTROCARDIOGRAM COMPLETE: CPT

## 2021-08-04 PROCEDURE — 93306 TTE W/DOPPLER COMPLETE: CPT

## 2021-08-04 PROCEDURE — 99214 OFFICE O/P EST MOD 30 MIN: CPT

## 2021-08-04 PROCEDURE — 93306 TTE W/DOPPLER COMPLETE: CPT | Mod: 26

## 2021-08-04 NOTE — HISTORY OF PRESENT ILLNESS
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  Suffers from PTSD and anxiety and follows with a therapist.  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 She denies CP or palpitations. She got off train for "fresh air" ate grapes and had water and felt better. she has had no other events since. \par \par \par Interval follow up 4/3/19\par c/o right calf pain --> ER r/o dvt on 3/30/19. No US avail at [facility] so had lovenox 75mg  x3 q12, us monday negative for DVT. OB aware. will OB [facility]\par \par medication on PNV\par compliant with diet\par \par plan ob vaginal delivery \par now 28 weeks due 2019\par \par ROS: Denies Chest pain, dyspnea, palpitations, dizziness or syncope.\par \par Interval Note\par May 22, 2019\par \par Patient returns for her final cardiac visit prior to delivery. She is currently 35 weeks pregnant. Blood pressure today is stable. EKG is in sinus rhythm, stable and unchanged.\par \par She denies any shortness of breath, dizziness, palpitations.\par \par Most recent echocardiogram was performed today \par \par Interval Note\par May 12, 2021\par \par Patient returns after a long hiatus. She delivered her first child on 2019, full term,  without complications.\par Currently, patient presents once again 18 weeks pregnant. \par Blood pressure today is low: 94/61. She reports that she has had anything to eat or drink today except coffee-> encouraged her to hydrate.\par EKG- sinus rhythm, stable and unchanged.\par \par Repeat echocardiogram today demonstrated: \par Normal LV function\par LVEF 61%\par Global longitudinal strain- 19.2%\par \par Denies issues with shortness of breath, palpitations or chest discomfort.\par \par Interval Note\par 2021\par \par 30 year old female with history of acute B lymphoblastic leukemia, now 21 years ago off therapy-> . S/P exposure to Daunorubicin \par She delivered her first child in 2019,  without complications. \par \par Returns for a cardiac follow up and assessment, she is 30 weeks pregnant.\par \par DUE DATE: 2021\par LMP:           2021\par \par Patient underwent an echocardiogram with global longitudinal strain evaluation today. \par Preliminary read: Normal LV function, GLS -18 \par \par Ms. Ospina reports feeling well, offers no complaints of shortness of breath, palpitations or chest discomfort.\par \par No vaccination\par \par \par

## 2021-08-04 NOTE — ASSESSMENT
[FreeTextEntry1] : 30 year old  with history of ALL 21 years ago. 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  She is currently 30 weeks pregnant with her second child.\par \par # Blood pressure\par    Mildly low, without symptoms  \par  -Encouraging patient to increase hydration\par -EKG is in sinus rhythm, stable and unchanged\par \par #S/P Chemotherapy\par - Echo today demonstrated normal LV function, GLS -18 \par --Due date October 11, 2021; LMP January 8, 2021\par \par Feels well, tolerating pregnancy without any cardiovascular complications\par \par \par Patient to follow up prior to delivery at the end of September

## 2021-08-04 NOTE — PHYSICAL EXAM
[General Appearance - Well Developed] : well developed [Normal Conjunctiva] : the conjunctiva exhibited no abnormalities [Eyelids - No Xanthelasma] : the eyelids demonstrated no xanthelasmas [Normal Oral Mucosa] : normal oral mucosa [No Oral Pallor] : no oral pallor [No Oral Cyanosis] : no oral cyanosis [Normal Jugular Venous A Waves Present] : normal jugular venous A waves present [Normal Jugular Venous V Waves Present] : normal jugular venous V waves present [No Jugular Venous Crespo A Waves] : no jugular venous crespo A waves [Respiration, Rhythm And Depth] : normal respiratory rhythm and effort [Exaggerated Use Of Accessory Muscles For Inspiration] : no accessory muscle use [Auscultation Breath Sounds / Voice Sounds] : lungs were clear to auscultation bilaterally [Normal] : normal [Normal Rate] : normal [Normal S1] : normal S1 [Normal S2] : normal S2 [No Murmur] : no murmurs heard [No Pitting Edema] : no pitting edema present [Abdomen Soft] : soft [Abdomen Tenderness] : non-tender [] : no hepato-splenomegaly [Abnormal Walk] : normal gait [Gait - Sufficient For Exercise Testing] : the gait was sufficient for exercise testing [Nail Clubbing] : no clubbing of the fingernails [Cyanosis, Localized] : no localized cyanosis [Petechial Hemorrhages (___cm)] : no petechial hemorrhages [Skin Color & Pigmentation] : normal skin color and pigmentation [No Venous Stasis] : no venous stasis [Oriented To Time, Place, And Person] : oriented to person, place, and time

## 2021-09-23 ENCOUNTER — LABORATORY RESULT (OUTPATIENT)
Age: 31
End: 2021-09-23

## 2021-09-29 ENCOUNTER — APPOINTMENT (OUTPATIENT)
Dept: CARDIOLOGY | Facility: CLINIC | Age: 31
End: 2021-09-29
Payer: MEDICAID

## 2021-09-29 ENCOUNTER — NON-APPOINTMENT (OUTPATIENT)
Age: 31
End: 2021-09-29

## 2021-09-29 VITALS
BODY MASS INDEX: 34.78 KG/M2 | HEIGHT: 62 IN | DIASTOLIC BLOOD PRESSURE: 68 MMHG | HEART RATE: 90 BPM | OXYGEN SATURATION: 98 % | SYSTOLIC BLOOD PRESSURE: 107 MMHG | WEIGHT: 189 LBS

## 2021-09-29 DIAGNOSIS — Z85.6 PERSONAL HISTORY OF LEUKEMIA: ICD-10-CM

## 2021-09-29 PROCEDURE — 99214 OFFICE O/P EST MOD 30 MIN: CPT

## 2021-09-29 PROCEDURE — 93000 ELECTROCARDIOGRAM COMPLETE: CPT

## 2021-09-29 NOTE — ASSESSMENT
[FreeTextEntry1] : 30 year old  with history of ALL 21 years ago. 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  She is currently 30 weeks pregnant with her second child.\par \par # Blood pressure\par    Normotensive: 107/68\par   -EKG is in sinus rhythm, stable and unchanged\par \par #S/P Chemotherapy\par - Echo in August 2021 demonstrated normal LV function, GLS -18 \par --Due date October 11, 2021\par \par Feels well, tolerating pregnancy without any cardiovascular complications\par \par Follow up in one year.

## 2021-09-29 NOTE — HISTORY OF PRESENT ILLNESS
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  Suffers from PTSD and anxiety and follows with a therapist.  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 She denies CP or palpitations. She got off train for "fresh air" ate grapes and had water and felt better. she has had no other events since. \par \par \par Interval follow up 4/3/19\par c/o right calf pain --> ER r/o dvt on 3/30/19. No US avail at [facility] so had lovenox 75mg  x3 q12, us monday negative for DVT. OB aware. will OB [facility]\par \par medication on PNV\par compliant with diet\par \par plan ob vaginal delivery \par now 28 weeks due 2019\par \par ROS: Denies Chest pain, dyspnea, palpitations, dizziness or syncope.\par \par Interval Note\par May 22, 2019\par \par Patient returns for her final cardiac visit prior to delivery. She is currently 35 weeks pregnant. Blood pressure today is stable. EKG is in sinus rhythm, stable and unchanged.\par \par She denies any shortness of breath, dizziness, palpitations.\par \par Most recent echocardiogram was performed today \par \par Interval Note\par May 12, 2021\par \par Patient returns after a long hiatus. She delivered her first child on 2019, full term,  without complications.\par Currently, patient presents once again 18 weeks pregnant. \par Blood pressure today is low: 94/61. She reports that she has had anything to eat or drink today except coffee-> encouraged her to hydrate.\par EKG- sinus rhythm, stable and unchanged.\par \par Repeat echocardiogram today demonstrated: \par Normal LV function\par LVEF 61%\par Global longitudinal strain- 19.2%\par \par Denies issues with shortness of breath, palpitations or chest discomfort.\par \par Interval Note\par 2021\par \par 30 year old female with history of acute B lymphoblastic leukemia, now 21 years ago off therapy-> . S/P exposure to Daunorubicin \par She delivered her first child in 2019,  without complications. \par \par Returns for a cardiac follow up and assessment, she is 30 weeks pregnant.\par \par DUE DATE: 2021\par LMP:           2021\par \par Patient underwent an echocardiogram with global longitudinal strain evaluation today. \par Preliminary read: Normal LV function, GLS -18 \par \par Ms. Ospina reports feeling well, offers no complaints of shortness of breath, palpitations or chest discomfort.\par \par No vaccination\par \par Interval Note\par 2021\par \par 30 year old female with history as outlined above:\par \par History of acute B lymphoblastic leukemia, now 21 years ago off therapy-> . S/P exposure to Daunorubicin \par She delivered her first child in 2019,  without complications. \par \par Patient returns for final followup before delivery. She is 38 weeks gestation.\par  BP stable, EKG unchanged.\par \par She is scheduled to deliver at [facility].\par Routinely monitored by NP, Felisha Smith\par \par To-date,  no complications or issues. \par BP normotensive\par EKG sinus rhythm, stable and unchanged.\par \par Denies any complaints of shortness of breath, palpitations, or chest discomfort.\par \par \par \par

## 2021-12-08 NOTE — PAST MEDICAL HISTORY
Pt has follow up appointment 12/8, will complete PA form after appointment.
See 12/8/2021 encounter.
[Menarche Age: ____] : age at menarche was [unfilled]

## 2021-12-22 ENCOUNTER — APPOINTMENT (OUTPATIENT)
Dept: PEDIATRIC HEMATOLOGY/ONCOLOGY | Facility: CLINIC | Age: 31
End: 2021-12-22

## 2022-01-05 ENCOUNTER — APPOINTMENT (OUTPATIENT)
Dept: PEDIATRIC HEMATOLOGY/ONCOLOGY | Facility: CLINIC | Age: 32
End: 2022-01-05
Payer: MEDICAID

## 2022-01-05 VITALS
HEART RATE: 84 BPM | HEIGHT: 62.6 IN | TEMPERATURE: 97.3 F | WEIGHT: 178.13 LBS | SYSTOLIC BLOOD PRESSURE: 110 MMHG | DIASTOLIC BLOOD PRESSURE: 79 MMHG | BODY MASS INDEX: 31.96 KG/M2

## 2022-01-05 PROCEDURE — 99215 OFFICE O/P EST HI 40 MIN: CPT

## 2022-01-05 RX ORDER — UBIDECARENONE/VIT E ACET 100MG-5
25 MCG CAPSULE ORAL
Refills: 0 | Status: ACTIVE | COMMUNITY
Start: 2022-01-05

## 2022-01-06 NOTE — HISTORY OF PRESENT ILLNESS
[de-identified] : History of SR Pre B-cell Acute Lymphocystic Leukemia\par Protocol  [facility] [place] POG 9406\par Diagnosed on: 1997\par Ended treatment: 2/15/2000\par \par MELISSA is 31 year old women with a history of acute B lymphoblastic leukemia, now 21.9 years off-therapy. Since her last visit in the survivorship program, MELISSA has been well without any persistent fevers, weight loss or night sweats. Melissa reported  of a baby girl in Oct, 2021. Melissa has temporarily stopped seeing a therapist, due to her busy schedule taking care of her children. She reported overall doing well with support from her family..\par \par Today she reports left flank dull intermittent pain that radiates across the left upper abdomen. This pain started a week ago with some pelvic pressure and urinary frequency, she is prescribed Macrobid for 7 days by her OB/GYN.  \par \par MELISSA has been living at home with her family, her parents, boyfriend and her daughters and her sister and her family.. 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 She reports trying to generally having a healthy diet and no formal exercise.  \par \par  [de-identified] : Daunomycin 180 mg/m2 [de-identified] : Yes [de-identified] : Yes [de-identified] : Yes [de-identified] : Yes [de-identified] : Yes [de-identified] : Yes [de-identified] : Yes

## 2022-01-06 NOTE — PHYSICAL EXAM
[Cervical Lymph Nodes Enlarged Posterior Bilaterally] : posterior cervical [Supraclavicular Lymph Nodes Enlarged Bilaterally] : supraclavicular [Cervical Lymph Nodes Enlarged Anterior Bilaterally] : anterior cervical [100: Normal, no complaints, no evidence of disease.] : 100: Normal, no complaints, no evidence of disease. [Normal] : No murmurs, rubs, gallops [de-identified] : left CVA tenderness.

## 2022-01-06 NOTE — SOCIAL HISTORY
[Mother] : mother [Father] : father [Spouse/Partner] : spouse/partner [Sexually Active] : sexually active [Number of Partners ___] : with [unfilled]  partner(s)  [de-identified] : daughter

## 2022-01-06 NOTE — CONSULT LETTER
[Dear  ___] : Dear  [unfilled], [Courtesy Letter:] : I had the pleasure of seeing your patient, [unfilled], in my office today. [Please see my note below.] : Please see my note below. [Consult Closing:] : Thank you very much for allowing me to participate in the care of this patient.  If you have any questions, please do not hesitate to contact me. [Sincerely,] : Sincerely, [FreeTextEntry3] : SHELLEY Cook\par Family Nurse Practitioner \par [facility] \par Pediatric Hematology/Oncology\par Survivors Facing Forward Program\par [email or web address]\par [phone]\par \par \par   \par \par \par

## 2022-01-06 NOTE — PAST MEDICAL HISTORY
[Definite:  ___ (Date)] : the last menstrual period was [unfilled] [FreeTextEntry2] : has not resumed menstruation since  in Oct, 2021

## 2022-01-06 NOTE — REVIEW OF SYSTEMS
[Anxiety] : anxiety [Negative] : Gastrointestinal [Suicidal] : not suicidal [Poor Sleep] : no poor sleep [Depression] : no depression

## 2022-01-10 ENCOUNTER — APPOINTMENT (OUTPATIENT)
Dept: PEDIATRIC HEMATOLOGY/ONCOLOGY | Facility: CLINIC | Age: 32
End: 2022-01-10

## 2022-01-13 ENCOUNTER — APPOINTMENT (OUTPATIENT)
Dept: INTERNAL MEDICINE | Facility: CLINIC | Age: 32
End: 2022-01-13
Payer: MEDICAID

## 2022-01-13 VITALS
WEIGHT: 176 LBS | SYSTOLIC BLOOD PRESSURE: 121 MMHG | DIASTOLIC BLOOD PRESSURE: 80 MMHG | HEART RATE: 98 BPM | TEMPERATURE: 98.3 F | BODY MASS INDEX: 31.18 KG/M2 | HEIGHT: 63 IN | OXYGEN SATURATION: 98 %

## 2022-01-13 DIAGNOSIS — E66.3 OVERWEIGHT: ICD-10-CM

## 2022-01-13 DIAGNOSIS — Z83.3 FAMILY HISTORY OF DIABETES MELLITUS: ICD-10-CM

## 2022-01-13 DIAGNOSIS — Z23 ENCOUNTER FOR IMMUNIZATION: ICD-10-CM

## 2022-01-13 DIAGNOSIS — Z82.0 FAMILY HISTORY OF EPILEPSY AND OTHER DISEASES OF THE NERVOUS SYSTEM: ICD-10-CM

## 2022-01-13 PROCEDURE — G0008: CPT

## 2022-01-13 PROCEDURE — 90686 IIV4 VACC NO PRSV 0.5 ML IM: CPT

## 2022-01-13 PROCEDURE — G0444 DEPRESSION SCREEN ANNUAL: CPT | Mod: 59

## 2022-01-13 PROCEDURE — 99204 OFFICE O/P NEW MOD 45 MIN: CPT | Mod: 25

## 2022-01-13 NOTE — HISTORY OF PRESENT ILLNESS
[de-identified] : 31F PMH obesity, hypertriglyceridemia, anxiety/depression, ALL, vitamin d insufficiency, mild scoliosis, tension headache, cholecystectomy who presents as a new patient for annual physical.\par \par She recently delivered her 2nd child in October, 2021.\par She lives with extended family including her boyfriend, her parents, and her sister's whole family.\par Of note, she had COVID-19 on December 20th.\par Her primary complaint is L sided flank pain that seems to radiate to the LUQ. Her pain started New Years Sheree and was also associated with some vaginal soreness and increased frequency. She called her gynecologist who prescribed her 7 days of Macrobid. She also notes increased bowel movements and loose stools that started about 3 days later, reporting ~3 BM/d instead of 1. Describes the pain as sometimes like a pressure and notes it when she is changing her daughter, relieved by hunching.\par She notes her symptoms of abdominal discomfort and loose stools particularly occur after meals and thought perhaps it was related to greasy food since she had her gallbladder out. Her symptoms do improve with BM, thinks they are worse in the morning and at night.\par No overt blood in the urine noted but she is on her period.\par \par Immunizations: She received the Pfizer COVID-19 vaccine x2, would like the flu shot today, Tdap during pregnancy. \par Pap: She is unsure when she last had a pap, will follow-up with Gyn

## 2022-01-13 NOTE — PHYSICAL EXAM
[No Acute Distress] : no acute distress [Well-Appearing] : well-appearing [Normal Sclera/Conjunctiva] : normal sclera/conjunctiva [EOMI] : extraocular movements intact [Normal Outer Ear/Nose] : the outer ears and nose were normal in appearance [Normal Oropharynx] : the oropharynx was normal [No JVD] : no jugular venous distention [Supple] : supple [No Respiratory Distress] : no respiratory distress  [No Accessory Muscle Use] : no accessory muscle use [Clear to Auscultation] : lungs were clear to auscultation bilaterally [Normal Rate] : normal rate  [Regular Rhythm] : with a regular rhythm [Normal S1, S2] : normal S1 and S2 [No Edema] : there was no peripheral edema [Soft] : abdomen soft [Non Tender] : non-tender [Non-distended] : non-distended [No CVA Tenderness] : no CVA  tenderness [No Joint Swelling] : no joint swelling [No Rash] : no rash [No Skin Lesions] : no skin lesions [Coordination Grossly Intact] : coordination grossly intact [No Focal Deficits] : no focal deficits [Normal Gait] : normal gait [Speech Grossly Normal] : speech grossly normal [Normal Affect] : the affect was normal [Normal Mood] : the mood was normal [de-identified] : no back or flank tenderness

## 2022-01-13 NOTE — ASSESSMENT
[FreeTextEntry1] : 31F PMH obesity, hypertriglyceridemia, anxiety/depression, ALL, vitamin d insufficiency, mild scoliosis, tension headache, cholecystectomy who presents as a new patient for annual physical.\par \par # flank and LUQ pain: Wide differential, including kidney stone or other kidney pathology (L flank, vaginal pain), mild panc insuffic (given location and associated bowel symptoms, although no clear cause), her hx mild scoliosis (positional w/changing baby), IBS (abd discomfort and associated BMs, relief, morning). Diarrhea potentially antibiotic-induced given time-course but lower likelihood w/macrobid. \par - F/u general CPE blood work, including CBC, and CMP (eval renal fnc).\par - Urinalysis, patient will wait until period resolved to r/o blood.\par - US renal provided, however patient will await results of blood/urine before scheduling provided no acute changes, and will also monitor for resolution in the coming days. Would like to avoid excess radiation/contrast.\par - Monitor dietary associations\par \par # Depressed mood: mild depressive symptoms, she is postpartum.\par - Has social support\par - C/w therapy\par - RTC if worsening\par \par # HCM:\par - Flu shot today\par - F/u with Gyn for pap, does not know when she had it\par - CBC, CMP, Lipid, A1c, Vit D\par - HIV screen

## 2022-01-13 NOTE — REVIEW OF SYSTEMS
[Abdominal Pain] : abdominal pain [Diarrhea] : diarrhea [Frequency] : frequency [Negative] : Heme/Lymph [Nausea] : no nausea [Constipation] : no constipation [Vomiting] : no vomiting [Heartburn] : no heartburn [Melena] : no melena [Dysuria] : no dysuria [Incontinence] : no incontinence [FreeTextEntry9] : hx mild scoliosis

## 2022-01-13 NOTE — HEALTH RISK ASSESSMENT
[Never] : Never [1 or 2 (0 pts)] : 1 or 2 (0 points) [Never (0 pts)] : Never (0 points) [No] : In the past 12 months have you used drugs other than those required for medical reasons? No [1] : 2) Feeling down, depressed, or hopeless for several days (1) [PHQ-2 Positive] : PHQ-2 Positive [Several Days (1)] : 7.) Trouble concentrating on things, such as reading a newspaper or watching television? Several days [Not at All (0)] : 8.) Moving or speaking so slowly that other people could have noticed, or the opposite, moving or speaking faster than usual? Not at all [Mild] : severity of depression is mild [Not at all] : How difficult have these problems made it for you to do your work, take care of things at home, or get along with people? Not at all [I have developed a follow-up plan documented below in the note.] : I have developed a follow-up plan documented below in the note. [Audit-CScore] : 0 [[id number]] : 2 [[id number]] : 7

## 2022-01-15 ENCOUNTER — LABORATORY RESULT (OUTPATIENT)
Age: 32
End: 2022-01-15

## 2022-01-17 LAB
25(OH)D3 SERPL-MCNC: 26.2 NG/ML
ALBUMIN SERPL ELPH-MCNC: 4.9 G/DL
ALP BLD-CCNC: 79 U/L
ALT SERPL-CCNC: 16 U/L
ANION GAP SERPL CALC-SCNC: 11 MMOL/L
AST SERPL-CCNC: 18 U/L
BASOPHILS # BLD AUTO: 0.02 K/UL
BASOPHILS NFR BLD AUTO: 0.4 %
BILIRUB SERPL-MCNC: 0.6 MG/DL
BUN SERPL-MCNC: 12 MG/DL
CALCIUM SERPL-MCNC: 9.5 MG/DL
CHLORIDE SERPL-SCNC: 104 MMOL/L
CHOLEST SERPL-MCNC: 148 MG/DL
CO2 SERPL-SCNC: 25 MMOL/L
CREAT SERPL-MCNC: 0.62 MG/DL
EOSINOPHIL # BLD AUTO: 0.12 K/UL
EOSINOPHIL NFR BLD AUTO: 2.4 %
ESTIMATED AVERAGE GLUCOSE: 97 MG/DL
GLUCOSE SERPL-MCNC: 83 MG/DL
HBA1C MFR BLD HPLC: 5 %
HCT VFR BLD CALC: 42.2 %
HDLC SERPL-MCNC: 56 MG/DL
HGB BLD-MCNC: 13.9 G/DL
HIV1+2 AB SPEC QL IA.RAPID: NONREACTIVE
IMM GRANULOCYTES NFR BLD AUTO: 0.4 %
LDLC SERPL CALC-MCNC: 72 MG/DL
LPL SERPL-CCNC: 22 U/L
LYMPHOCYTES # BLD AUTO: 1.19 K/UL
LYMPHOCYTES NFR BLD AUTO: 23.5 %
MAN DIFF?: NORMAL
MCHC RBC-ENTMCNC: 29.7 PG
MCHC RBC-ENTMCNC: 32.9 GM/DL
MCV RBC AUTO: 90.2 FL
MONOCYTES # BLD AUTO: 0.31 K/UL
MONOCYTES NFR BLD AUTO: 6.1 %
NEUTROPHILS # BLD AUTO: 3.41 K/UL
NEUTROPHILS NFR BLD AUTO: 67.2 %
NONHDLC SERPL-MCNC: 92 MG/DL
PLATELET # BLD AUTO: 233 K/UL
POTASSIUM SERPL-SCNC: 4.8 MMOL/L
PROT SERPL-MCNC: 7.8 G/DL
RBC # BLD: 4.68 M/UL
RBC # FLD: 13.5 %
SODIUM SERPL-SCNC: 140 MMOL/L
TRIGL SERPL-MCNC: 99 MG/DL
TSH SERPL-ACNC: 0.82 UIU/ML
WBC # FLD AUTO: 5.07 K/UL

## 2022-01-21 ENCOUNTER — OUTPATIENT (OUTPATIENT)
Dept: OUTPATIENT SERVICES | Facility: HOSPITAL | Age: 32
LOS: 1 days | End: 2022-01-21
Payer: MEDICAID

## 2022-01-21 ENCOUNTER — APPOINTMENT (OUTPATIENT)
Dept: ULTRASOUND IMAGING | Facility: CLINIC | Age: 32
End: 2022-01-21
Payer: MEDICAID

## 2022-01-21 DIAGNOSIS — R10.12 LEFT UPPER QUADRANT PAIN: ICD-10-CM

## 2022-01-21 PROCEDURE — 76770 US EXAM ABDO BACK WALL COMP: CPT

## 2022-01-21 PROCEDURE — 76770 US EXAM ABDO BACK WALL COMP: CPT | Mod: 26

## 2022-03-24 ENCOUNTER — NON-APPOINTMENT (OUTPATIENT)
Age: 32
End: 2022-03-24

## 2022-03-24 ENCOUNTER — APPOINTMENT (OUTPATIENT)
Dept: INTERNAL MEDICINE | Facility: CLINIC | Age: 32
End: 2022-03-24
Payer: MEDICAID

## 2022-03-24 VITALS
WEIGHT: 172 LBS | HEIGHT: 63 IN | OXYGEN SATURATION: 98 % | DIASTOLIC BLOOD PRESSURE: 67 MMHG | BODY MASS INDEX: 30.48 KG/M2 | HEART RATE: 89 BPM | TEMPERATURE: 98.8 F | SYSTOLIC BLOOD PRESSURE: 100 MMHG

## 2022-03-24 PROCEDURE — 99214 OFFICE O/P EST MOD 30 MIN: CPT | Mod: 25

## 2022-03-24 PROCEDURE — 93000 ELECTROCARDIOGRAM COMPLETE: CPT

## 2022-03-24 NOTE — HISTORY OF PRESENT ILLNESS
[de-identified] : 31F PMH obesity, hypertriglyceridemia, anxiety/depression, ALL, vitamin d insufficiency, mild scoliosis, tension headache, COVID-19 infection (12/21/21), cholecystectomy who presents for follow-up\par \par Her chief complaint today is chest pain and late period.\par \par She estimates that her chest pain started ~5 days ago. At first it would come and go, now it is most of the time. Pain is located in the superior/lateral L breast, sometimes the mid-medial L breast, and sometimes underneath lateral L breast. No specific associations noted, including with exertion, eating, or any specific activity. She does note that her baby often kicks the breast while she is changing her, and also she always holds her on the L side with the babies head in that area, but no other specific potentially causative associations noted.\par She denies palpitations, SOB, [place], orthopnea, PND, dizziness. Does note cardiac concerns bc of history of daunorubicin. \par \par Regarding her missed period, her LMN lasted 2/13-2/18, she normally gets it every month around the 13th. She thought she noted mild cramping and bloating around the normal time of her period but no bleeding. She did an at home urinary pregnancy test that was negative. \par She stopped breastfeeding in January. Not currently on birth control. \par \par Patient also notes post-viral cough associated with PND and phlegm in throat after lying down, viral illness 4 weeks ago. No active infectious symptoms.

## 2022-03-24 NOTE — REVIEW OF SYSTEMS
[Chest Pain] : chest pain [Negative] : Heme/Lymph [Palpitations] : no palpitations [Lower Ext Edema] : no lower extremity edema [Orthopnea] : no orthopnea [Paroxysmal Nocturnal Dyspnea] : no paroxysmal nocturnal dyspnea

## 2022-03-24 NOTE — PHYSICAL EXAM
[No Acute Distress] : no acute distress [Well-Appearing] : well-appearing [Normal Sclera/Conjunctiva] : normal sclera/conjunctiva [EOMI] : extraocular movements intact [Normal Outer Ear/Nose] : the outer ears and nose were normal in appearance [No JVD] : no jugular venous distention [Supple] : supple [No Respiratory Distress] : no respiratory distress  [No Accessory Muscle Use] : no accessory muscle use [Clear to Auscultation] : lungs were clear to auscultation bilaterally [Normal Rate] : normal rate  [Regular Rhythm] : with a regular rhythm [Normal S1, S2] : normal S1 and S2 [No Edema] : there was no peripheral edema [Soft] : abdomen soft [Non Tender] : non-tender [Normal Anterior Cervical Nodes] : no anterior cervical lymphadenopathy [No Joint Swelling] : no joint swelling [Grossly Normal Strength/Tone] : grossly normal strength/tone [No Rash] : no rash [No Skin Lesions] : no skin lesions [Coordination Grossly Intact] : coordination grossly intact [Normal Gait] : normal gait [Normal Affect] : the affect was normal [Normal Mood] : the mood was normal [de-identified] : very mild tenderness in regions of pain indicated

## 2022-03-24 NOTE — ASSESSMENT
[FreeTextEntry1] : 31F PMH obesity, hypertriglyceridemia, anxiety/depression, ALL, vitamin d insufficiency, mild scoliosis, tension headache, COVID-19 infection (12/21/21), cholecystectomy who presents for follow-up\par \par # Atypical chest pain: very low suspicion for cardiac given presentation of symptoms, EKG performed due to history of daunorubicin exposure, EKG reviewed and normal. Unlikely mastitis, no palpated masses and she has not had any lactation since stopped breastfeeding in Jan. Suspect mild MSK strain vs tenderness of breast tissue from position or kicking from baby.\par - EKG reviewed\par - NSAIDs short term and monitor\par \par # Missed period: Home pregnancy test negative. Likely due to recent discontinuation of breastfeeding vs pregnancy.\par - Serum pregnancy test\par - If negative, monitor, blood testing in 2-3 months if she continues to miss periods.\par - Patient has appointment to discuss birth control\par \par # Post-viral cough: associated PND, 4 weeks.\par - Flonase\par

## 2022-03-25 LAB
HCG SERPL QL: NEGATIVE
PAPP-A SERPL-ACNC: <1 MIU/ML

## 2022-08-18 ENCOUNTER — LABORATORY RESULT (OUTPATIENT)
Age: 32
End: 2022-08-18

## 2022-08-18 ENCOUNTER — NON-APPOINTMENT (OUTPATIENT)
Age: 32
End: 2022-08-18

## 2022-08-18 ENCOUNTER — APPOINTMENT (OUTPATIENT)
Dept: GASTROENTEROLOGY | Facility: CLINIC | Age: 32
End: 2022-08-18

## 2022-08-18 VITALS
WEIGHT: 169 LBS | DIASTOLIC BLOOD PRESSURE: 68 MMHG | BODY MASS INDEX: 29.95 KG/M2 | SYSTOLIC BLOOD PRESSURE: 110 MMHG | HEIGHT: 63 IN | HEART RATE: 88 BPM

## 2022-08-18 DIAGNOSIS — Z83.79 FAMILY HISTORY OF OTHER DISEASES OF THE DIGESTIVE SYSTEM: ICD-10-CM

## 2022-08-18 DIAGNOSIS — Z87.19 PERSONAL HISTORY OF OTHER DISEASES OF THE DIGESTIVE SYSTEM: ICD-10-CM

## 2022-08-18 PROCEDURE — 99204 OFFICE O/P NEW MOD 45 MIN: CPT | Mod: 25

## 2022-08-18 PROCEDURE — 36415 COLL VENOUS BLD VENIPUNCTURE: CPT

## 2022-08-18 NOTE — CONSULT LETTER
[Dear  ___] : Dear  [unfilled], [Consult Letter:] : I had the pleasure of evaluating your patient, [unfilled]. [Please see my note below.] : Please see my note below. [Consult Closing:] : Thank you very much for allowing me to participate in the care of this patient.  If you have any questions, please do not hesitate to contact me. [Sincerely,] : Sincerely, [FreeTextEntry3] : Anjel Pillai M.D.\par

## 2022-08-18 NOTE — PHYSICAL EXAM
[General Appearance - Alert] : alert [General Appearance - In No Acute Distress] : in no acute distress [General Appearance - Well Nourished] : well nourished [General Appearance - Well Developed] : well developed [Sclera] : the sclera and conjunctiva were normal [Neck Appearance] : the appearance of the neck was normal [Neck Cervical Mass (___cm)] : no neck mass was observed [Jugular Venous Distention Increased] : there was no jugular-venous distention [Thyroid Diffuse Enlargement] : the thyroid was not enlarged [Thyroid Nodule] : there were no palpable thyroid nodules [Auscultation Breath Sounds / Voice Sounds] : lungs were clear to auscultation bilaterally [Heart Rate And Rhythm] : heart rate was normal and rhythm regular [Heart Sounds] : normal S1 and S2 [Heart Sounds Gallop] : no gallops [Murmurs] : no murmurs [Heart Sounds Pericardial Friction Rub] : no pericardial rub [Full Pulse] : the pedal pulses are present [Edema] : there was no peripheral edema [Bowel Sounds] : normal bowel sounds [Abdomen Soft] : soft [Abdomen Tenderness] : non-tender [Abdomen Mass (___ Cm)] : no abdominal mass palpated [Patient Refused] : rectal exam was refused by the patient [Cervical Lymph Nodes Enlarged Posterior Bilaterally] : posterior cervical [Cervical Lymph Nodes Enlarged Anterior Bilaterally] : anterior cervical [Supraclavicular Lymph Nodes Enlarged Bilaterally] : supraclavicular [Inguinal Lymph Nodes Enlarged Bilaterally] : inguinal [Nail Clubbing] : no clubbing  or cyanosis of the fingernails [Musculoskeletal - Swelling] : no joint swelling seen [Skin Color & Pigmentation] : normal skin color and pigmentation [Skin Turgor] : normal skin turgor [] : no rash [Oriented To Time, Place, And Person] : oriented to person, place, and time [Impaired Insight] : insight and judgment were intact [Affect] : the affect was normal [FreeTextEntry1] : deferred (menses)

## 2022-08-18 NOTE — REASON FOR VISIT
[Consultation] : a consultation visit [FreeTextEntry1] : Pre endoscopy exam -pt has abdominal pain - PCR could be IBS- possible fatty liver

## 2022-08-18 NOTE — ASSESSMENT
[FreeTextEntry1] : 1.  Recent abdominal pain, gassiness, relieved by defecation--symptoms suggest IBS-C.\par 2.  History of GERD, atypical chest pain; gastritis at prior EGDs (March 2018, September 2014).\par 3.  ALL diagnosed 1997, status post chemotherapy, in remission.\par 4.  Fatty liver, by history.\par 5.  Obesity.\par 6.  Hyperlipidemia.\par 7.  History of anxiety/depression.\par 8.  DJD of spine.\par 9.  Status post COVID 19 December 2021.\par 10.  Hypovitaminosis D.\par 11.  Status post laparoscopic cholecystectomy.\par 12.  Allergic to Timentin.\par \par Plan:\par 1.  Extensive medical records have been reviewed.\par 2.  Handout on IBS given and discussed.  Dietary instruction given.\par 3.  Extensive bloodwork drawn by me this morning.  She will call for test results after the weekend.\par 4.  At this point, no plans for endoscopic evaluation.\par 5.  PPI can be taken as needed for reflux, chest pain.

## 2022-08-18 NOTE — REVIEW OF SYSTEMS
[Abdominal Pain] : abdominal pain [Vaginal Discharge] : vaginal discharge [Emotional Problems] : emotional problems [Negative] : Heme/Lymph

## 2022-08-18 NOTE — HISTORY OF PRESENT ILLNESS
[FreeTextEntry1] : Melissa was diagnosed with ALL 1997, status post chemotherapy, in remission.  Since earlier this year, she has noted intermittent abdominal pain, constipation with some gas; symptoms would be relieved by defecation.  Symptoms were more likely to occur after dinner, and were worse with stress.  However, symptoms have dramatically improved over the past 1-2 months.  She has had long-standing issues with heartburn, especially after ingesting greasy, spicy, or salty foods.  She also has history of noncardiac chest pain.  She has undergone two EGDs (Dr. Mcgee), the more recent in March 2018, both showing gastric erythema.

## 2022-08-22 LAB
ALBUMIN SERPL ELPH-MCNC: 4.8 G/DL
ALP BLD-CCNC: 80 U/L
ALT SERPL-CCNC: 13 U/L
AMYLASE/CREAT SERPL: 85 U/L
ANION GAP SERPL CALC-SCNC: 11 MMOL/L
AST SERPL-CCNC: 15 U/L
BASOPHILS # BLD AUTO: 0.02 K/UL
BASOPHILS NFR BLD AUTO: 0.3 %
BILIRUB DIRECT SERPL-MCNC: 0.1 MG/DL
BILIRUB SERPL-MCNC: 0.3 MG/DL
BUN SERPL-MCNC: 15 MG/DL
CALCIUM SERPL-MCNC: 9.4 MG/DL
CHLORIDE SERPL-SCNC: 105 MMOL/L
CHOLEST SERPL-MCNC: 137 MG/DL
CO2 SERPL-SCNC: 24 MMOL/L
CREAT SERPL-MCNC: 0.58 MG/DL
CRP SERPL-MCNC: <3 MG/L
EGFR: 126 ML/MIN/1.73M2
EOSINOPHIL # BLD AUTO: 0.1 K/UL
EOSINOPHIL NFR BLD AUTO: 1.5 %
ERYTHROCYTE [SEDIMENTATION RATE] IN BLOOD BY WESTERGREN METHOD: 10 MM/HR
ESTIMATED AVERAGE GLUCOSE: 103 MG/DL
FERRITIN SERPL-MCNC: 60 NG/ML
GGT SERPL-CCNC: 13 U/L
GLUCOSE SERPL-MCNC: 109 MG/DL
HBA1C MFR BLD HPLC: 5.2 %
HCT VFR BLD CALC: 43.7 %
HDLC SERPL-MCNC: 47 MG/DL
HGB BLD-MCNC: 13.7 G/DL
IMM GRANULOCYTES NFR BLD AUTO: 0.2 %
IRON SATN MFR SERPL: 35 %
IRON SERPL-MCNC: 134 UG/DL
LDLC SERPL CALC-MCNC: 49 MG/DL
LPL SERPL-CCNC: 29 U/L
LYMPHOCYTES # BLD AUTO: 1.77 K/UL
LYMPHOCYTES NFR BLD AUTO: 27.4 %
MAGNESIUM SERPL-MCNC: 2 MG/DL
MAN DIFF?: NORMAL
MCHC RBC-ENTMCNC: 28.8 PG
MCHC RBC-ENTMCNC: 31.4 GM/DL
MCV RBC AUTO: 91.8 FL
MONOCYTES # BLD AUTO: 0.37 K/UL
MONOCYTES NFR BLD AUTO: 5.7 %
NEUTROPHILS # BLD AUTO: 4.2 K/UL
NEUTROPHILS NFR BLD AUTO: 64.9 %
NONHDLC SERPL-MCNC: 90 MG/DL
PHOSPHATE SERPL-MCNC: 3.6 MG/DL
PLATELET # BLD AUTO: 233 K/UL
POTASSIUM SERPL-SCNC: 4.1 MMOL/L
PROT SERPL-MCNC: 7.6 G/DL
RBC # BLD: 4.76 M/UL
RBC # FLD: 12.7 %
SODIUM SERPL-SCNC: 140 MMOL/L
T3 SERPL-MCNC: 109 NG/DL
T3RU NFR SERPL: 0.9 TBI
T4 FREE SERPL-MCNC: 1.3 NG/DL
T4 SERPL-MCNC: 7.4 UG/DL
TIBC SERPL-MCNC: 384 UG/DL
TRIGL SERPL-MCNC: 200 MG/DL
TSH SERPL-ACNC: 0.75 UIU/ML
UIBC SERPL-MCNC: 251 UG/DL
WBC # FLD AUTO: 6.47 K/UL

## 2022-10-10 ENCOUNTER — NON-APPOINTMENT (OUTPATIENT)
Age: 32
End: 2022-10-10

## 2022-11-11 ENCOUNTER — APPOINTMENT (OUTPATIENT)
Dept: INTERNAL MEDICINE | Facility: CLINIC | Age: 32
End: 2022-11-11

## 2022-11-11 VITALS
DIASTOLIC BLOOD PRESSURE: 64 MMHG | OXYGEN SATURATION: 98 % | HEIGHT: 63 IN | WEIGHT: 175 LBS | TEMPERATURE: 98.5 F | SYSTOLIC BLOOD PRESSURE: 120 MMHG | BODY MASS INDEX: 31.01 KG/M2 | HEART RATE: 92 BPM

## 2022-11-11 DIAGNOSIS — N92.6 IRREGULAR MENSTRUATION, UNSPECIFIED: ICD-10-CM

## 2022-11-11 PROCEDURE — 99214 OFFICE O/P EST MOD 30 MIN: CPT

## 2022-11-11 NOTE — PHYSICAL EXAM
[No Acute Distress] : no acute distress [Well-Appearing] : well-appearing [Normal Sclera/Conjunctiva] : normal sclera/conjunctiva [EOMI] : extraocular movements intact [Normal Outer Ear/Nose] : the outer ears and nose were normal in appearance [Normal Oropharynx] : the oropharynx was normal [No JVD] : no jugular venous distention [Supple] : supple [No Respiratory Distress] : no respiratory distress  [No Accessory Muscle Use] : no accessory muscle use [Clear to Auscultation] : lungs were clear to auscultation bilaterally [Normal Rate] : normal rate  [Regular Rhythm] : with a regular rhythm [Normal S1, S2] : normal S1 and S2 [No Edema] : there was no peripheral edema [Soft] : abdomen soft [Non Tender] : non-tender [Non-distended] : non-distended [No HSM] : no HSM [No Joint Swelling] : no joint swelling [No Rash] : no rash [No Skin Lesions] : no skin lesions [Coordination Grossly Intact] : coordination grossly intact [No Focal Deficits] : no focal deficits [Normal Gait] : normal gait [Speech Grossly Normal] : speech grossly normal [Memory Grossly Normal] : memory grossly normal [Normal Affect] : the affect was normal [Normal Mood] : the mood was normal [Normal Insight/Judgement] : insight and judgment were intact

## 2022-11-11 NOTE — ASSESSMENT
[FreeTextEntry1] : 31F PMH obesity, hypertriglyceridemia, anxiety/depression, ALL, vitamin d insufficiency, mild scoliosis, tension headache, COVID-19 infection (12/21/21), cholecystectomy, IBS who presents for follow-up.\par \par # Bloating: will rule out pregnancy, particularly as she has missed her last period. Otherwise, I suspect these symptoms are related to her IBS, particularly as she has veered heavily away from the diet she was on for it previously, particularly in the setting of stressors of her youngest infant and leaving her job, which may in-turn also affect her dietary choices. \par - Pregnancy test\par - simethicone PRN\par - Recommend resuming healthier previous diet\par - May consider other agents for IBS, will r/o pregnancy and trial dietary changes first.

## 2022-11-11 NOTE — REVIEW OF SYSTEMS
[Negative] : Heme/Lymph [Fever] : no fever [Chills] : no chills [Fatigue] : no fatigue [Nausea] : no nausea [Constipation] : no constipation [Diarrhea] : diarrhea [Vomiting] : no vomiting [FreeTextEntry7] : bloating, cramping, gas

## 2022-11-11 NOTE — HISTORY OF PRESENT ILLNESS
[de-identified] : 31F PMH obesity, hypertriglyceridemia, anxiety/depression, ALL, vitamin d insufficiency, mild scoliosis, tension headache, COVID-19 infection (12/21/21), cholecystectomy, IBS who presents for follow-up.\par \par She notes intermittent abdominal cramps, bloating, and increased appetite for the past few weeks, describes it as similar to how she feels on her period. She notes some increased gas but no diarrhea/constipation. She thought she might be pregnant again since her last period was in September. She took 3 home urine pregnancy tests, all were negative.\par She also notes that she stopped working in October due to frustrations/tensions with her boss and work-load, and since then she has been eating "horribly", and not in line with her recommended IBS diet, mostly in the form of heavy and greasy foods.\par She has also been stressed out taking care of her youngest child.\par She has missed her last period.

## 2022-11-14 LAB
HCG SERPL QL: NEGATIVE
PAPP-A SERPL-ACNC: <1 MIU/ML

## 2022-11-30 ENCOUNTER — APPOINTMENT (OUTPATIENT)
Dept: DERMATOLOGY | Facility: CLINIC | Age: 32
End: 2022-11-30

## 2023-02-22 ENCOUNTER — APPOINTMENT (OUTPATIENT)
Dept: INTERNAL MEDICINE | Facility: CLINIC | Age: 33
End: 2023-02-22
Payer: MEDICAID

## 2023-02-22 VITALS
HEART RATE: 81 BPM | HEIGHT: 63 IN | WEIGHT: 171 LBS | TEMPERATURE: 98.4 F | DIASTOLIC BLOOD PRESSURE: 63 MMHG | OXYGEN SATURATION: 98 % | SYSTOLIC BLOOD PRESSURE: 94 MMHG | BODY MASS INDEX: 30.3 KG/M2

## 2023-02-22 PROCEDURE — 99213 OFFICE O/P EST LOW 20 MIN: CPT

## 2023-02-22 NOTE — HISTORY OF PRESENT ILLNESS
[FreeTextEntry8] : 31 y/o female \par Hx of ALL in childhood ~ 23 years off therapy, treated with anthracycline obesity, IBS\par \par \par c/o 3 weeks of left ear clogged with decrease in hearing\par initially could clear it out by blowing her nose, now will not clear.\par no sore throat\par ear feels sore\par no recent swimming\par no discharge seen (but felt a dripping sensation)\par no cold symptoms\par [facility] was told it was due to wax\par Has a hx of hearing loss on the right which she attributes to her chemo treatments in childhood\par is scheduled for ENT in March\par \par ALso with left sided flank discomfort about a month ago\par also had urinary frequency, treated with abx\par recent improvement and neg testing at \par In [place] 15 18 2022 c/o L sided flank pain, radiating to LUQ\par -UA Jan 2022 16 WBC, >27 Sq EPis, culture with 3+ organisms\par -Sono of kidneys 2022 - no hydronephrosis, normal otherwise\par \par

## 2023-02-22 NOTE — ASSESSMENT
[FreeTextEntry1] : 31 y/o female\par \par left ear discomfort and decreased hearing\par -ccerumen removed, TM appears nomral\par -no signs of infection\par -clogged sensation may be secondary to ET dysfunction/sinus congestion\par -trial of flonase x 2 weeks\par -to see ENT next month, will need hearing testing given hx of loss on right\par \par Left flank:  no uirinary symptoms at this time, recent UA negative as per patient\par similar symptoms in past, muscular etiolgy possible\par -advised heat, topical (icy hot) to area\par -return if not improving\par

## 2023-02-22 NOTE — PHYSICAL EXAM
[No Acute Distress] : no acute distress [Normal Sclera/Conjunctiva] : normal sclera/conjunctiva [PERRL] : pupils equal round and reactive to light [Normal Outer Ear/Nose] : the outer ears and nose were normal in appearance [No Lymphadenopathy] : no lymphadenopathy [Supple] : supple [No Respiratory Distress] : no respiratory distress  [Clear to Auscultation] : lungs were clear to auscultation bilaterally [Soft] : abdomen soft [Non Tender] : non-tender [Normal Supraclavicular Nodes] : no supraclavicular lymphadenopathy [Normal Posterior Cervical Nodes] : no posterior cervical lymphadenopathy [Normal Anterior Cervical Nodes] : no anterior cervical lymphadenopathy [de-identified] : MMM, no exudate, posterior pharunx slightly red, no tonsillar enalrgement.  External ear normal, no pain with manipulation of the pinna, no discharge.  soft cerumen removed, TM clear, external ear canal normal.   [de-identified] : mild tenderness to left flank with firm palpation [de-identified] : no rash, redness or lesions on left neck or scalp

## 2023-03-20 ENCOUNTER — APPOINTMENT (OUTPATIENT)
Dept: INTERNAL MEDICINE | Facility: CLINIC | Age: 33
End: 2023-03-20
Payer: MEDICAID

## 2023-03-20 VITALS
OXYGEN SATURATION: 98 % | TEMPERATURE: 98.1 F | DIASTOLIC BLOOD PRESSURE: 68 MMHG | WEIGHT: 168 LBS | SYSTOLIC BLOOD PRESSURE: 107 MMHG | HEIGHT: 63 IN | BODY MASS INDEX: 29.77 KG/M2 | HEART RATE: 90 BPM

## 2023-03-20 DIAGNOSIS — R14.0 ABDOMINAL DISTENSION (GASEOUS): ICD-10-CM

## 2023-03-20 DIAGNOSIS — Z23 ENCOUNTER FOR IMMUNIZATION: ICD-10-CM

## 2023-03-20 DIAGNOSIS — F41.9 ANXIETY DISORDER, UNSPECIFIED: ICD-10-CM

## 2023-03-20 DIAGNOSIS — Z87.39 PERSONAL HISTORY OF OTHER DISEASES OF THE MUSCULOSKELETAL SYSTEM AND CONNECTIVE TISSUE: ICD-10-CM

## 2023-03-20 DIAGNOSIS — R05.8 OTHER SPECIFIED COUGH: ICD-10-CM

## 2023-03-20 DIAGNOSIS — R07.89 OTHER CHEST PAIN: ICD-10-CM

## 2023-03-20 DIAGNOSIS — N92.6 IRREGULAR MENSTRUATION, UNSPECIFIED: ICD-10-CM

## 2023-03-20 DIAGNOSIS — R51.9 HEADACHE, UNSPECIFIED: ICD-10-CM

## 2023-03-20 DIAGNOSIS — Z34.90 ENCOUNTER FOR SUPERVISION OF NORMAL PREGNANCY, UNSPECIFIED, UNSPECIFIED TRIMESTER: ICD-10-CM

## 2023-03-20 DIAGNOSIS — F32.A ANXIETY DISORDER, UNSPECIFIED: ICD-10-CM

## 2023-03-20 DIAGNOSIS — K58.1 IRRITABLE BOWEL SYNDROME WITH CONSTIPATION: ICD-10-CM

## 2023-03-20 DIAGNOSIS — H92.02 OTALGIA, LEFT EAR: ICD-10-CM

## 2023-03-20 DIAGNOSIS — K21.9 GASTRO-ESOPHAGEAL REFLUX DISEASE W/OUT ESOPHAGITIS: ICD-10-CM

## 2023-03-20 DIAGNOSIS — Z87.19 PERSONAL HISTORY OF OTHER DISEASES OF THE DIGESTIVE SYSTEM: ICD-10-CM

## 2023-03-20 DIAGNOSIS — Z79.899 OTHER LONG TERM (CURRENT) DRUG THERAPY: ICD-10-CM

## 2023-03-20 DIAGNOSIS — Z87.898 PERSONAL HISTORY OF OTHER SPECIFIED CONDITIONS: ICD-10-CM

## 2023-03-20 PROCEDURE — 99395 PREV VISIT EST AGE 18-39: CPT

## 2023-03-20 RX ORDER — SIMETHICONE 180 MG
180 CAPSULE ORAL
Qty: 120 | Refills: 1 | Status: DISCONTINUED | COMMUNITY
Start: 2022-11-11 | End: 2023-03-20

## 2023-03-20 NOTE — PHYSICAL EXAM
[No Acute Distress] : no acute distress [Well-Appearing] : well-appearing [Normal Voice/Communication] : normal voice/communication [No Carotid Bruits] : no carotid bruits [Pedal Pulses Present] : the pedal pulses are present [No Edema] : there was no peripheral edema [Normal] : affect was normal and insight and judgment were intact [Coordination Grossly Intact] : coordination grossly intact [No Focal Deficits] : no focal deficits [Normal Gait] : normal gait [de-identified] : right TM obscured by cerumen, decreased hearing on the right

## 2023-03-20 NOTE — ASSESSMENT
[FreeTextEntry1] : ALL:\par Follows with survivorship program annually \par Echocardiogram 8/4/21, normal and should be repeated every 5 years, followed by cardiology.  Prior anthracycline exposure. \par \par H/o anxiety and depression:\par Mood has been good recently, thinks working out has helped.  \par \par Overweight/obesity:\par Exercising regularly and working on eating healthy\par  \par HM:\par Skin cancer screening - advised yearly with dermatology, referral provided\par Cervical cancer screening - pap with gyn, thinks last year, 2022\par Dental UTD \par tdap UTD - 2021\par Flu shot done in November \par COVID vaccine done with booster x 1 \par labs reviewed, no repeat needed today\par

## 2023-03-20 NOTE — HISTORY OF PRESENT ILLNESS
[de-identified] : 32F PMH obesity, hypertriglyceridemia, anxiety/depression, ALL, vitamin d insufficiency, mild scoliosis, tension headache, cholecystectomy. \bhavesh Has been working with a , mostly weight lifting, tries to walk.  \par Mood has been better since she started exercising.  \bhavesh Takes melatonin occasionally, maybe once a week.  \bhavesh Has some decreased hearing on the right.  Possible 2/2 chemotherapy.  Scheduled with ENT.  \bhavesh

## 2023-03-20 NOTE — HEALTH RISK ASSESSMENT
[Good] : ~his/her~  mood as  good [No] : In the past 12 months have you used drugs other than those required for medical reasons? No [0] : 2) Feeling down, depressed, or hopeless: Not at all (0) [PHQ-2 Negative - No further assessment needed] : PHQ-2 Negative - No further assessment needed [With Significant Other] : lives with significant other [With Family] : lives with family [Employed] : employed [Significant Other] : lives with significant other [# Of Children ___] : has [unfilled] children [Sexually Active] : sexually active [Smoke Detector] : smoke detector [Carbon Monoxide Detector] : carbon monoxide detector [Never] : Never [de-identified] : weight lifting primarily, 3-4 times/week; tries to walk  [[id number]] : 0 [High Risk Behavior] : no high risk behavior [Reports changes in hearing] : Reports no changes in hearing [Reports changes in vision] : Reports no changes in vision [Reports changes in dental health] : Reports no changes in dental health [Guns at Home] : no guns at home

## 2023-04-05 ENCOUNTER — APPOINTMENT (OUTPATIENT)
Dept: INTERNAL MEDICINE | Facility: CLINIC | Age: 33
End: 2023-04-05

## 2023-05-01 ENCOUNTER — APPOINTMENT (OUTPATIENT)
Dept: PEDIATRIC HEMATOLOGY/ONCOLOGY | Facility: CLINIC | Age: 33
End: 2023-05-01
Payer: MEDICAID

## 2023-05-01 ENCOUNTER — NON-APPOINTMENT (OUTPATIENT)
Age: 33
End: 2023-05-01

## 2023-05-01 VITALS
BODY MASS INDEX: 30.68 KG/M2 | SYSTOLIC BLOOD PRESSURE: 123 MMHG | DIASTOLIC BLOOD PRESSURE: 79 MMHG | WEIGHT: 168.87 LBS | HEART RATE: 91 BPM | HEIGHT: 62.28 IN

## 2023-05-01 DIAGNOSIS — Z82.69 FAMILY HISTORY OF OTHER DISEASES OF THE MUSCULOSKELETAL SYSTEM AND CONNECTIVE TISSUE: ICD-10-CM

## 2023-05-01 PROCEDURE — 99215 OFFICE O/P EST HI 40 MIN: CPT

## 2023-05-01 PROCEDURE — 99417 PROLNG OP E/M EACH 15 MIN: CPT

## 2023-05-02 LAB
25(OH)D3 SERPL-MCNC: 21.6 NG/ML
ALBUMIN SERPL ELPH-MCNC: 4.8 G/DL
ALP BLD-CCNC: 72 U/L
ALT SERPL-CCNC: 14 U/L
ANION GAP SERPL CALC-SCNC: 11 MMOL/L
APPEARANCE: CLEAR
AST SERPL-CCNC: 16 U/L
BACTERIA: ABNORMAL /HPF
BASOPHILS # BLD AUTO: 0.04 K/UL
BASOPHILS NFR BLD AUTO: 0.5 %
BILIRUB SERPL-MCNC: 0.2 MG/DL
BILIRUBIN URINE: NEGATIVE
BLOOD URINE: ABNORMAL
BUN SERPL-MCNC: 18 MG/DL
CALCIUM SERPL-MCNC: 9.6 MG/DL
CAST: 0 /LPF
CHLORIDE SERPL-SCNC: 103 MMOL/L
CHOLEST SERPL-MCNC: 137 MG/DL
CO2 SERPL-SCNC: 25 MMOL/L
COLOR: YELLOW
CREAT SERPL-MCNC: 0.62 MG/DL
EGFR: 123 ML/MIN/1.73M2
EOSINOPHIL # BLD AUTO: 0.19 K/UL
EOSINOPHIL NFR BLD AUTO: 2.3 %
EPITHELIAL CELLS: 8 /HPF
ESTIMATED AVERAGE GLUCOSE: 100 MG/DL
GLUCOSE QUALITATIVE U: NEGATIVE MG/DL
GLUCOSE SERPL-MCNC: 96 MG/DL
HBA1C MFR BLD HPLC: 5.1 %
HCT VFR BLD CALC: 41.6 %
HDLC SERPL-MCNC: 50 MG/DL
HGB BLD-MCNC: 13.4 G/DL
IMM GRANULOCYTES NFR BLD AUTO: 0.1 %
KETONES URINE: NEGATIVE MG/DL
LDLC SERPL CALC-MCNC: 68 MG/DL
LEUKOCYTE ESTERASE URINE: NEGATIVE
LYMPHOCYTES # BLD AUTO: 2.06 K/UL
LYMPHOCYTES NFR BLD AUTO: 24.6 %
MAN DIFF?: NORMAL
MCHC RBC-ENTMCNC: 29.3 PG
MCHC RBC-ENTMCNC: 32.2 GM/DL
MCV RBC AUTO: 90.8 FL
MICROSCOPIC-UA: NORMAL
MONOCYTES # BLD AUTO: 0.57 K/UL
MONOCYTES NFR BLD AUTO: 6.8 %
NEUTROPHILS # BLD AUTO: 5.51 K/UL
NEUTROPHILS NFR BLD AUTO: 65.7 %
NITRITE URINE: NEGATIVE
NONHDLC SERPL-MCNC: 87 MG/DL
PH URINE: 6.5
PLATELET # BLD AUTO: 244 K/UL
POTASSIUM SERPL-SCNC: 4.3 MMOL/L
PROT SERPL-MCNC: 7.6 G/DL
PROTEIN URINE: NEGATIVE MG/DL
RBC # BLD: 4.58 M/UL
RBC # FLD: 13 %
RED BLOOD CELLS URINE: 0 /HPF
SODIUM SERPL-SCNC: 140 MMOL/L
SPECIFIC GRAVITY URINE: 1.01
TRIGL SERPL-MCNC: 96 MG/DL
UROBILINOGEN URINE: 0.2 MG/DL
WBC # FLD AUTO: 8.38 K/UL
WHITE BLOOD CELLS URINE: 1 /HPF

## 2023-05-02 NOTE — HISTORY OF PRESENT ILLNESS
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  She was\par treated as per protocol POG 9406 with chemotherapy. Melissa completed therapy in 2000, and is now 23 years off-therapy.\par \par Since her last visit in the survivorship program, MELISSA has been well without any persistent fevers, weight loss or night sweats. Melissa has 2 biological daughters who are almost 2 and 4 years old.  Melissa has temporarily stopped seeing a therapist, due to her busy schedule taking care of her children. She reports that her almost 2 year old daughter wakes up several times a night and she has trouble falling back asleep. She because tearful when she mentioned that she is scared of a fatality including a car or airplane crash.  Discussed the benefits of finding the time to reconnect with her therapist.\par \par MELISSA has been living at home with her family, her parents, boyfriend Vikas and her 2 daughters, and her sister and her family. 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  She performs weight lifting exercise at home 2 days a week and eats a well rounded diet. SHe has eliminated greasy/oily foods to help improve her gas pain and has seen much relief. \par  [de-identified] : Teniposide: Yes\par Doxorubicin equivalent dose: 90 mg/m2 [de-identified] : 180 mg/m2 [de-identified] : Yes [de-identified] : Yes [de-identified] : Yes [de-identified] : Yes [de-identified] : Yes [de-identified] : Yes [de-identified] : Yes [de-identified] : Yes [de-identified] : Yes [de-identified] : Yes

## 2023-05-02 NOTE — SOCIAL HISTORY
[Mother] : mother [Father] : father [Spouse/Partner] : spouse/partner [Sexually Active] : sexually active [Number of Partners ___] : with [unfilled]  partner(s)  [Condoms] : condoms [de-identified] : 2 daughters [FreeTextEntry1] : completed some college, did not graduate

## 2023-05-02 NOTE — PHYSICAL EXAM
[Cervical Lymph Nodes Enlarged Posterior Bilaterally] : posterior cervical [Cervical Lymph Nodes Enlarged Anterior Bilaterally] : anterior cervical [Supraclavicular Lymph Nodes Enlarged Bilaterally] : supraclavicular [Normal] : affect appropriate [100: Normal, no complaints, no evidence of disease.] : 100: Normal, no complaints, no evidence of disease. [No focal deficits] : no focal deficits [Gait normal] : gait normal [de-identified] : right and left upper chest mediport scars intact

## 2023-05-02 NOTE — CONSULT LETTER
[Courtesy Letter:] : I had the pleasure of seeing your patient, [unfilled], in my office today. [Please see my note below.] : Please see my note below. [Consult Closing:] : Thank you very much for allowing me to participate in the care of this patient.  If you have any questions, please do not hesitate to contact me. [FreeTextEntry2] : [facility]\par General Internal Medicine\par [address] [place] [address]\[place] [address]\par  [FreeTextEntry1] : Melissa was seen for her annual follow up visit in the Survivors [facility]acing Forward Program at the [facility].  [FreeTextEntry3] : \par SHELLEY Anderson\par Family Nurse Practitioner \par [facility] \par Pediatric Hematology/Oncology\par Surviv[facility] Program\par [phone]\par [email or web address] \par \par   \par \par

## 2023-05-03 ENCOUNTER — APPOINTMENT (OUTPATIENT)
Dept: OTOLARYNGOLOGY | Facility: CLINIC | Age: 33
End: 2023-05-03
Payer: MEDICAID

## 2023-05-03 VITALS
WEIGHT: 163 LBS | BODY MASS INDEX: 28.88 KG/M2 | DIASTOLIC BLOOD PRESSURE: 65 MMHG | SYSTOLIC BLOOD PRESSURE: 99 MMHG | HEIGHT: 63 IN

## 2023-05-03 PROCEDURE — 92567 TYMPANOMETRY: CPT

## 2023-05-03 PROCEDURE — 99202 OFFICE O/P NEW SF 15 MIN: CPT

## 2023-05-03 PROCEDURE — 99213 OFFICE O/P EST LOW 20 MIN: CPT | Mod: 25

## 2023-05-03 PROCEDURE — 92557 COMPREHENSIVE HEARING TEST: CPT

## 2023-05-03 NOTE — PHYSICAL EXAM
[de-identified] : right cerumen removed tm intact [Midline] : trachea located in midline position [Normal] : no rashes

## 2023-05-03 NOTE — HISTORY OF PRESENT ILLNESS
[de-identified] : 32 year old female presents for general ears checkup. \par Recently seen PCP around March for b/l clogged ears. Ear cleaning done. Referred to ENT to check ears. \par Reports since she was younger experienced right ear hearing loss.\par States when hearing from right side of ear unable to hear some words.\par Hearing loss has not gotten worse over time. \par Left ear stable for hearing at this time. \par Patient denies otalgia, otorrhea, ear infections, tinnitus, dizziness, vertigo, headaches related to hearing. \par

## 2023-05-03 NOTE — ASSESSMENT
[FreeTextEntry1] : 32 year old female with clogged ear right, cerumen removed. 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 Audio done today. Not interested in HA.

## 2023-05-03 NOTE — PROCEDURE
[FreeTextEntry1] : Cerumen removal right ear [FreeTextEntry2] : Clogged ear right [FreeTextEntry3] : Procedure: Removal of rightl cerumen with microscopy assistance\par \par Pre-operative diagnosis: Ear pain and hearing loss\par \par Details:\par A speculum was placed into the right external auditory canal and the ear canal and tympanic membrane was viewed under otomicroscopy. Cerumen was present within the canal. This was removed using suction and ear curette. The tympanic membrane was viewed intact. There was no evidence of middle ear effusion. \par \par Post-operative diagnosis: right cerumen\par

## 2023-05-22 ENCOUNTER — APPOINTMENT (OUTPATIENT)
Dept: DERMATOLOGY | Facility: CLINIC | Age: 33
End: 2023-05-22
Payer: MEDICAID

## 2023-05-22 DIAGNOSIS — L82.1 OTHER SEBORRHEIC KERATOSIS: ICD-10-CM

## 2023-05-22 DIAGNOSIS — D22.9 MELANOCYTIC NEVI, UNSPECIFIED: ICD-10-CM

## 2023-05-22 DIAGNOSIS — L85.3 XEROSIS CUTIS: ICD-10-CM

## 2023-05-22 DIAGNOSIS — Z12.83 ENCOUNTER FOR SCREENING FOR MALIGNANT NEOPLASM OF SKIN: ICD-10-CM

## 2023-05-22 PROCEDURE — 99204 OFFICE O/P NEW MOD 45 MIN: CPT

## 2023-05-22 RX ORDER — TRETINOIN 0.25 MG/G
0.03 CREAM TOPICAL
Qty: 1 | Refills: 6 | Status: ACTIVE | COMMUNITY
Start: 2023-05-22 | End: 1900-01-01

## 2023-05-23 ENCOUNTER — NON-APPOINTMENT (OUTPATIENT)
Age: 33
End: 2023-05-23

## 2023-05-24 ENCOUNTER — NON-APPOINTMENT (OUTPATIENT)
Age: 33
End: 2023-05-24

## 2023-07-14 ENCOUNTER — APPOINTMENT (OUTPATIENT)
Dept: OBGYN | Facility: CLINIC | Age: 33
End: 2023-07-14

## 2023-09-19 ENCOUNTER — NON-APPOINTMENT (OUTPATIENT)
Age: 33
End: 2023-09-19

## 2023-10-02 ENCOUNTER — APPOINTMENT (OUTPATIENT)
Dept: INTERNAL MEDICINE | Facility: CLINIC | Age: 33
End: 2023-10-02
Payer: MEDICAID

## 2023-10-02 VITALS
HEART RATE: 78 BPM | WEIGHT: 163 LBS | BODY MASS INDEX: 28.88 KG/M2 | DIASTOLIC BLOOD PRESSURE: 68 MMHG | SYSTOLIC BLOOD PRESSURE: 100 MMHG | RESPIRATION RATE: 16 BRPM | HEIGHT: 63 IN | OXYGEN SATURATION: 98 %

## 2023-10-02 DIAGNOSIS — Z23 ENCOUNTER FOR IMMUNIZATION: ICD-10-CM

## 2023-10-02 DIAGNOSIS — Z81.8 FAMILY HISTORY OF OTHER MENTAL AND BEHAVIORAL DISORDERS: ICD-10-CM

## 2023-10-02 DIAGNOSIS — M25.571 PAIN IN RIGHT ANKLE AND JOINTS OF RIGHT FOOT: ICD-10-CM

## 2023-10-02 DIAGNOSIS — Z78.9 OTHER SPECIFIED HEALTH STATUS: ICD-10-CM

## 2023-10-02 PROCEDURE — G0008: CPT

## 2023-10-02 PROCEDURE — 90686 IIV4 VACC NO PRSV 0.5 ML IM: CPT

## 2023-10-02 PROCEDURE — 99214 OFFICE O/P EST MOD 30 MIN: CPT | Mod: 25

## 2023-10-02 RX ORDER — FLUCONAZOLE 150 MG/1
150 TABLET ORAL
Qty: 1 | Refills: 0 | Status: COMPLETED | COMMUNITY
Start: 2023-08-29

## 2023-10-02 RX ORDER — SULFAMETHOXAZOLE AND TRIMETHOPRIM 800; 160 MG/1; MG/1
800-160 TABLET ORAL
Qty: 14 | Refills: 0 | Status: COMPLETED | COMMUNITY
Start: 2023-09-05

## 2023-10-13 ENCOUNTER — NON-APPOINTMENT (OUTPATIENT)
Age: 33
End: 2023-10-13

## 2023-10-19 ENCOUNTER — NON-APPOINTMENT (OUTPATIENT)
Age: 33
End: 2023-10-19

## 2023-10-20 ENCOUNTER — APPOINTMENT (OUTPATIENT)
Dept: DERMATOLOGY | Facility: CLINIC | Age: 33
End: 2023-10-20

## 2023-10-25 ENCOUNTER — APPOINTMENT (OUTPATIENT)
Dept: DERMATOLOGY | Facility: CLINIC | Age: 33
End: 2023-10-25
Payer: MEDICAID

## 2023-10-25 PROCEDURE — 99214 OFFICE O/P EST MOD 30 MIN: CPT

## 2023-10-25 RX ORDER — KETOCONAZOLE 20.5 MG/ML
2 SHAMPOO, SUSPENSION TOPICAL
Qty: 1 | Refills: 11 | Status: ACTIVE | COMMUNITY
Start: 2023-10-25 | End: 1900-01-01

## 2023-10-25 RX ORDER — HYDROCORTISONE 25 MG/ML
2.5 LOTION TOPICAL
Qty: 1 | Refills: 2 | Status: ACTIVE | COMMUNITY
Start: 2023-10-25 | End: 1900-01-01

## 2023-12-11 ENCOUNTER — APPOINTMENT (OUTPATIENT)
Dept: OTOLARYNGOLOGY | Facility: CLINIC | Age: 33
End: 2023-12-11
Payer: MEDICAID

## 2023-12-11 VITALS — DIASTOLIC BLOOD PRESSURE: 63 MMHG | HEART RATE: 81 BPM | SYSTOLIC BLOOD PRESSURE: 99 MMHG | OXYGEN SATURATION: 98 %

## 2023-12-11 PROCEDURE — 92557 COMPREHENSIVE HEARING TEST: CPT

## 2023-12-11 PROCEDURE — 99213 OFFICE O/P EST LOW 20 MIN: CPT | Mod: 25

## 2023-12-11 PROCEDURE — 92567 TYMPANOMETRY: CPT

## 2023-12-11 RX ORDER — CRANBERRY FRUIT EXTRACT 200 MG
CAPSULE ORAL
Refills: 0 | Status: ACTIVE | COMMUNITY

## 2023-12-11 RX ORDER — BIOTIN 10 MG
TABLET ORAL
Refills: 0 | Status: ACTIVE | COMMUNITY

## 2023-12-14 ENCOUNTER — NON-APPOINTMENT (OUTPATIENT)
Age: 33
End: 2023-12-14

## 2023-12-14 NOTE — HISTORY OF PRESENT ILLNESS
[de-identified] : 32 year old female presents for tinnitus and muffled hearing.  Hx of hearing loss s/p chemo for AL  Reports 2 episodes of left ear sharp otalgia - most recently last week  Reports intermittent left ear tinnitus - with no particular aggravating factors or interventions  No symptoms related to right ear at this time.  Hearing overall stable bilaterally.  Denies recent fevers or infections.

## 2023-12-14 NOTE — ASSESSMENT
[FreeTextEntry1] : 32 year old female with stable asymmetric hearing loss - discussed white noise/masking for tinnitus.

## 2024-01-24 ENCOUNTER — APPOINTMENT (OUTPATIENT)
Dept: DERMATOLOGY | Facility: CLINIC | Age: 34
End: 2024-01-24

## 2024-02-07 ENCOUNTER — NON-APPOINTMENT (OUTPATIENT)
Age: 34
End: 2024-02-07

## 2024-03-06 ENCOUNTER — APPOINTMENT (OUTPATIENT)
Dept: DERMATOLOGY | Facility: CLINIC | Age: 34
End: 2024-03-06

## 2024-03-27 ENCOUNTER — NON-APPOINTMENT (OUTPATIENT)
Age: 34
End: 2024-03-27

## 2024-03-27 ENCOUNTER — APPOINTMENT (OUTPATIENT)
Dept: DERMATOLOGY | Facility: CLINIC | Age: 34
End: 2024-03-27
Payer: MEDICAID

## 2024-03-27 DIAGNOSIS — D48.5 NEOPLASM OF UNCERTAIN BEHAVIOR OF SKIN: ICD-10-CM

## 2024-03-27 DIAGNOSIS — L70.0 ACNE VULGARIS: ICD-10-CM

## 2024-03-27 PROCEDURE — 11421 EXC H-F-NK-SP B9+MARG 0.6-1: CPT

## 2024-03-27 PROCEDURE — 99213 OFFICE O/P EST LOW 20 MIN: CPT | Mod: 25

## 2024-03-27 PROCEDURE — 12041 INTMD RPR N-HF/GENIT 2.5CM/<: CPT

## 2024-04-03 ENCOUNTER — APPOINTMENT (OUTPATIENT)
Dept: INTERNAL MEDICINE | Facility: CLINIC | Age: 34
End: 2024-04-03
Payer: MEDICAID

## 2024-04-03 VITALS
SYSTOLIC BLOOD PRESSURE: 104 MMHG | HEIGHT: 63 IN | HEART RATE: 100 BPM | BODY MASS INDEX: 28.7 KG/M2 | WEIGHT: 162 LBS | DIASTOLIC BLOOD PRESSURE: 60 MMHG | OXYGEN SATURATION: 99 % | RESPIRATION RATE: 16 BRPM

## 2024-04-03 DIAGNOSIS — S46.819A STRAIN OF OTHER MUSCLES, FASCIA AND TENDONS AT SHOULDER AND UPPER ARM LEVEL, UNSPECIFIED ARM, INITIAL ENCOUNTER: ICD-10-CM

## 2024-04-03 DIAGNOSIS — L72.9 FOLLICULAR CYST OF THE SKIN AND SUBCUTANEOUS TISSUE, UNSPECIFIED: ICD-10-CM

## 2024-04-03 DIAGNOSIS — R94.31 ABNORMAL ELECTROCARDIOGRAM [ECG] [EKG]: ICD-10-CM

## 2024-04-03 LAB
25(OH)D3 SERPL-MCNC: 20.8 NG/ML
ALBUMIN SERPL ELPH-MCNC: 4.8 G/DL
ALP BLD-CCNC: 72 U/L
ALT SERPL-CCNC: 17 U/L
ANION GAP SERPL CALC-SCNC: 12 MMOL/L
AST SERPL-CCNC: 22 U/L
BASOPHILS # BLD AUTO: 0.04 K/UL
BASOPHILS NFR BLD AUTO: 0.5 %
BILIRUB SERPL-MCNC: 0.2 MG/DL
BUN SERPL-MCNC: 14 MG/DL
CALCIUM SERPL-MCNC: 9.2 MG/DL
CHLORIDE SERPL-SCNC: 103 MMOL/L
CHOLEST SERPL-MCNC: 142 MG/DL
CO2 SERPL-SCNC: 24 MMOL/L
CREAT SERPL-MCNC: 0.61 MG/DL
DERMATOLOGY BIOPSY: NORMAL
EGFR: 123 ML/MIN/1.73M2
EOSINOPHIL # BLD AUTO: 0.2 K/UL
EOSINOPHIL NFR BLD AUTO: 2.7 %
GGT SERPL-CCNC: 13 U/L
GLUCOSE SERPL-MCNC: 92 MG/DL
HCT VFR BLD CALC: 41 %
HDLC SERPL-MCNC: 49 MG/DL
HGB BLD-MCNC: 13 G/DL
IMM GRANULOCYTES NFR BLD AUTO: 0.3 %
LDLC SERPL CALC-MCNC: 71 MG/DL
LYMPHOCYTES # BLD AUTO: 1.73 K/UL
LYMPHOCYTES NFR BLD AUTO: 23.5 %
MAN DIFF?: NORMAL
MCHC RBC-ENTMCNC: 29.1 PG
MCHC RBC-ENTMCNC: 31.7 GM/DL
MCV RBC AUTO: 91.7 FL
MONOCYTES # BLD AUTO: 0.36 K/UL
MONOCYTES NFR BLD AUTO: 4.9 %
NEUTROPHILS # BLD AUTO: 5 K/UL
NEUTROPHILS NFR BLD AUTO: 68.1 %
NONHDLC SERPL-MCNC: 93 MG/DL
PLATELET # BLD AUTO: 257 K/UL
POTASSIUM SERPL-SCNC: 4.6 MMOL/L
PROT SERPL-MCNC: 7.4 G/DL
RBC # BLD: 4.47 M/UL
RBC # FLD: 13 %
SODIUM SERPL-SCNC: 138 MMOL/L
TRIGL SERPL-MCNC: 125 MG/DL
WBC # FLD AUTO: 7.35 K/UL

## 2024-04-03 PROCEDURE — 99214 OFFICE O/P EST MOD 30 MIN: CPT | Mod: 25

## 2024-04-03 PROCEDURE — G2211 COMPLEX E/M VISIT ADD ON: CPT | Mod: NC,1L

## 2024-04-03 NOTE — PHYSICAL EXAM
[No Acute Distress] : no acute distress [Well Nourished] : well nourished [Well Developed] : well developed [Well-Appearing] : well-appearing [Normal Voice/Communication] : normal voice/communication [Normal Sclera/Conjunctiva] : normal sclera/conjunctiva [PERRL] : pupils equal round and reactive to light [Normal Outer Ear/Nose] : the outer ears and nose were normal in appearance [EOMI] : extraocular movements intact [Normal Oropharynx] : the oropharynx was normal [No JVD] : no jugular venous distention [No Lymphadenopathy] : no lymphadenopathy [Supple] : supple [Thyroid Normal, No Nodules] : the thyroid was normal and there were no nodules present [No Respiratory Distress] : no respiratory distress  [No Accessory Muscle Use] : no accessory muscle use [Clear to Auscultation] : lungs were clear to auscultation bilaterally [Normal Rate] : normal rate  [Regular Rhythm] : with a regular rhythm [Normal S1, S2] : normal S1 and S2 [No Murmur] : no murmur heard [No Carotid Bruits] : no carotid bruits [No Abdominal Bruit] : a ~M bruit was not heard ~T in the abdomen [Pedal Pulses Present] : the pedal pulses are present [No Edema] : there was no peripheral edema [Soft] : abdomen soft [Non-distended] : non-distended [Non Tender] : non-tender [No Masses] : no abdominal mass palpated [No HSM] : no HSM [Normal Bowel Sounds] : normal bowel sounds [Normal Posterior Cervical Nodes] : no posterior cervical lymphadenopathy [Normal Anterior Cervical Nodes] : no anterior cervical lymphadenopathy [No CVA Tenderness] : no CVA  tenderness [No Spinal Tenderness] : no spinal tenderness [No Joint Swelling] : no joint swelling [Grossly Normal Strength/Tone] : grossly normal strength/tone [Coordination Grossly Intact] : coordination grossly intact [No Rash] : no rash [No Focal Deficits] : no focal deficits [Normal Gait] : normal gait [Speech Grossly Normal] : speech grossly normal [Memory Grossly Normal] : memory grossly normal [Normal Affect] : the affect was normal [Alert and Oriented x3] : oriented to person, place, and time [Normal Mood] : the mood was normal [Normal Insight/Judgement] : insight and judgment were intact [de-identified] : Band aid at excision site c/d/i/ [de-identified] : + point tenderness at the lt trapezius.  Nl exam at the shoulder and arm.

## 2024-04-03 NOTE — HISTORY OF PRESENT ILLNESS
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 Has been following with derm.  Has been doing well.  notes reviewed. s/p rx for GI upset with PPI.  Has been better, but had recent episode of discomfort.  to f/u with GI.   s/p fall on the stairs while on the phone.  foot has been better s/p recent cyst excision at the neck by derm. s/p urgent care visit with arm pain.  Had EKG done and had q waves in v1-2.  she will be going to f/u with cardiology.  Has appt.  She was referred for PT.  Now has been intermittent.  Has been massaging the arm.  Thinks that it may be from the neck.    Gyn - a few weeks ago ophtho - reported as up to date.  for flu vaccine tdap '21

## 2024-04-03 NOTE — HEALTH RISK ASSESSMENT
[Yes] : Yes [1 or 2 (0 pts)] : 1 or 2 (0 points) [Never (0 pts)] : Never (0 points) [No] : In the past 12 months have you used drugs other than those required for medical reasons? No [PHQ-2 Negative - No further assessment needed] : PHQ-2 Negative - No further assessment needed [0] : 2) Feeling down, depressed, or hopeless: Not at all (0) [Never] : Never [de-identified] : very rare [[id number]] : 0 [Audit-CScore] : 0

## 2024-04-03 NOTE — REVIEW OF SYSTEMS
[Heartburn] : heartburn [Muscle Pain] : muscle pain [Negative] : Heme/Lymph [Joint Pain] : no joint pain [Joint Stiffness] : no joint stiffness [Joint Swelling] : no joint swelling [Back Pain] : no back pain [Muscle Weakness] : no muscle weakness [de-identified] : as per HPI

## 2024-05-06 ENCOUNTER — NON-APPOINTMENT (OUTPATIENT)
Age: 34
End: 2024-05-06

## 2024-05-08 ENCOUNTER — LABORATORY RESULT (OUTPATIENT)
Age: 34
End: 2024-05-08

## 2024-05-08 ENCOUNTER — APPOINTMENT (OUTPATIENT)
Dept: DERMATOLOGY | Facility: CLINIC | Age: 34
End: 2024-05-08
Payer: MEDICAID

## 2024-05-08 PROCEDURE — 99213 OFFICE O/P EST LOW 20 MIN: CPT

## 2024-05-08 RX ORDER — CLINDAMYCIN PHOSPHATE 10 MG/ML
1 SOLUTION TOPICAL
Qty: 1 | Refills: 1 | Status: ACTIVE | COMMUNITY
Start: 2024-05-08 | End: 1900-01-01

## 2024-05-08 NOTE — ASSESSMENT
[FreeTextEntry1] : #favor folliculitis f/u bact cx start clindamycin soln BID to AA if no improvement after a few days would rx mometasone soln   #seb derm, mild - ketoconazole shampoo weekly, leave in 5-10 min prior to rinsing

## 2024-05-08 NOTE — PHYSICAL EXAM
[Alert] : alert [Oriented x 3] : ~L oriented x 3 [Well Nourished] : well nourished [Conjunctiva Non-injected] : conjunctiva non-injected [No Visual Lymphadenopathy] : no visual  lymphadenopathy [No Clubbing] : no clubbing [No Edema] : no edema [No Bromhidrosis] : no bromhidrosis [No Chromhidrosis] : no chromhidrosis [FreeTextEntry3] : multiple papules and pustules throughout scalp minimal loose greasy scale

## 2024-05-08 NOTE — HISTORY OF PRESENT ILLNESS
[FreeTextEntry1] : mavis pruritus [de-identified] : CARLO WALTERS is a 33 year old F who present for f/u as below  #itchy scalp since a few weeks, progressively worsening to point of disrupting sleep using HC ointment and keto shampoo on sat and monday with minimal improvement.    no personal or family h/o skin cancer derm hx: seb derm

## 2024-05-13 ENCOUNTER — APPOINTMENT (OUTPATIENT)
Dept: PEDIATRIC HEMATOLOGY/ONCOLOGY | Facility: CLINIC | Age: 34
End: 2024-05-13

## 2024-06-05 ENCOUNTER — APPOINTMENT (OUTPATIENT)
Dept: DERMATOLOGY | Facility: CLINIC | Age: 34
End: 2024-06-05
Payer: MEDICAID

## 2024-06-05 DIAGNOSIS — L21.9 SEBORRHEIC DERMATITIS, UNSPECIFIED: ICD-10-CM

## 2024-06-05 DIAGNOSIS — L73.9 FOLLICULAR DISORDER, UNSPECIFIED: ICD-10-CM

## 2024-06-05 DIAGNOSIS — L64.9 ANDROGENIC ALOPECIA, UNSPECIFIED: ICD-10-CM

## 2024-06-05 PROCEDURE — 99213 OFFICE O/P EST LOW 20 MIN: CPT

## 2024-06-05 NOTE — PHYSICAL EXAM
[Alert] : alert [Oriented x 3] : ~L oriented x 3 [Well Nourished] : well nourished [Conjunctiva Non-injected] : conjunctiva non-injected [No Visual Lymphadenopathy] : no visual  lymphadenopathy [No Clubbing] : no clubbing [No Edema] : no edema [No Bromhidrosis] : no bromhidrosis [No Chromhidrosis] : no chromhidrosis [FreeTextEntry3] : scalp with 2 excoriations , otherwise clear minimal loose greasy scale decreased hair density at b/l temporal hairline with miniaturized hairs pt with long thick hair worn in ponytail

## 2024-06-05 NOTE — ASSESSMENT
[FreeTextEntry1] : # folliculitis - resolved with clindamycin reviewed results of bact cx with normal margaret clindamycin soln BID to AA PRN recurrence  #seb derm, mild - ketoconazole shampoo weekly, leave in 5-10 min prior to rinsing  #favor AGA, progressing discussed nature, chronicity and unpredictable course also with possible component of traction encouraged loose hairstyles  discussed nature and course of condition, including treatment options risks/benefits/alternatives involved (topical or PO minoxidil , spironolactone) start otc 5% minoxidil foam/soln daily, SED including burning sensation, initial hair shedding or shedding upon cessation, may take up to 9-12 months of consistent daily use to take effect

## 2024-06-05 NOTE — CONSULT LETTER
[Dear  ___] : Dear  [unfilled], [Consult Letter:] : I had the pleasure of evaluating your patient, [unfilled]. [Please see my note below.] : Please see my note below. [Consult Closing:] : Thank you very much for allowing me to participate in the care of this patient.  If you have any questions, please do not hesitate to contact me. [Sincerely,] : Sincerely, [FreeTextEntry3] : Naomie Guevara MD Department of Dermatology [place] and [facility] at [facility]

## 2024-06-05 NOTE — HISTORY OF PRESENT ILLNESS
[FreeTextEntry1] : mavis pruritus [de-identified] : CARLO WALTERS is a 33 year old F who present for f/u as below  #itchy scalp since a few weeks, significant improvement w/ clindamycin BID, keto shampoo BIW. last used clinda 2 weeks ago, no recurrence of the pruritus so far hx: progressively worsening to point of disrupting sleep using HC ointment and keto shampoo on sat and monday with minimal improvement.   #hair thinning at temples x yrs, untreated.  not pregnant or breastfeeding   no personal or family h/o skin cancer derm hx: seb derm

## 2024-06-10 ENCOUNTER — APPOINTMENT (OUTPATIENT)
Dept: PEDIATRIC HEMATOLOGY/ONCOLOGY | Facility: CLINIC | Age: 34
End: 2024-06-10
Payer: MEDICAID

## 2024-06-10 VITALS
WEIGHT: 161.56 LBS | SYSTOLIC BLOOD PRESSURE: 111 MMHG | BODY MASS INDEX: 28.99 KG/M2 | HEIGHT: 62.72 IN | DIASTOLIC BLOOD PRESSURE: 73 MMHG | HEART RATE: 97 BPM | OXYGEN SATURATION: 98 % | TEMPERATURE: 98.3 F

## 2024-06-10 DIAGNOSIS — Z91.89 OTHER SPECIFIED PERSONAL RISK FACTORS, NOT ELSEWHERE CLASSIFIED: ICD-10-CM

## 2024-06-10 DIAGNOSIS — Z92.21 PERSONAL HISTORY OF ANTINEOPLASTIC CHEMOTHERAPY: ICD-10-CM

## 2024-06-10 DIAGNOSIS — Z08 ENCOUNTER FOR FOLLOW-UP EXAMINATION AFTER COMPLETED TREATMENT FOR MALIGNANT NEOPLASM: ICD-10-CM

## 2024-06-10 LAB
25(OH)D3 SERPL-MCNC: 22.7 NG/ML
ALBUMIN SERPL ELPH-MCNC: 4.8 G/DL
ALP BLD-CCNC: 81 U/L
ALT SERPL-CCNC: 26 U/L
ANION GAP SERPL CALC-SCNC: 11 MMOL/L
AST SERPL-CCNC: 18 U/L
BASOPHILS # BLD AUTO: 0.02 K/UL
BASOPHILS NFR BLD AUTO: 0.3 %
BILIRUB SERPL-MCNC: 0.3 MG/DL
BUN SERPL-MCNC: 12 MG/DL
CALCIUM SERPL-MCNC: 9.7 MG/DL
CHLORIDE SERPL-SCNC: 104 MMOL/L
CHOLEST SERPL-MCNC: 154 MG/DL
CO2 SERPL-SCNC: 25 MMOL/L
CREAT SERPL-MCNC: 0.55 MG/DL
EGFR: 126 ML/MIN/1.73M2
EOSINOPHIL # BLD AUTO: 0.14 K/UL
EOSINOPHIL NFR BLD AUTO: 2.1 %
GLUCOSE SERPL-MCNC: 92 MG/DL
HCT VFR BLD CALC: 41.9 %
HDLC SERPL-MCNC: 65 MG/DL
HGB BLD-MCNC: 13.8 G/DL
IMM GRANULOCYTES NFR BLD AUTO: 0.3 %
LDLC SERPL CALC-MCNC: 71 MG/DL
LYMPHOCYTES # BLD AUTO: 1.86 K/UL
LYMPHOCYTES NFR BLD AUTO: 28.2 %
MAN DIFF?: NORMAL
MCHC RBC-ENTMCNC: 29.7 PG
MCHC RBC-ENTMCNC: 32.9 GM/DL
MCV RBC AUTO: 90.3 FL
MONOCYTES # BLD AUTO: 0.37 K/UL
MONOCYTES NFR BLD AUTO: 5.6 %
NEUTROPHILS # BLD AUTO: 4.18 K/UL
NEUTROPHILS NFR BLD AUTO: 63.5 %
NONHDLC SERPL-MCNC: 89 MG/DL
PLATELET # BLD AUTO: 229 K/UL
POTASSIUM SERPL-SCNC: 4.9 MMOL/L
PROT SERPL-MCNC: 7.6 G/DL
RBC # BLD: 4.64 M/UL
RBC # FLD: 13.3 %
SODIUM SERPL-SCNC: 140 MMOL/L
TRIGL SERPL-MCNC: 94 MG/DL
WBC # FLD AUTO: 6.59 K/UL

## 2024-06-10 PROCEDURE — 99417 PROLNG OP E/M EACH 15 MIN: CPT

## 2024-06-10 PROCEDURE — 99215 OFFICE O/P EST HI 40 MIN: CPT

## 2024-06-10 NOTE — CONSULT LETTER
[Courtesy Letter:] : I had the pleasure of seeing your patient, [unfilled], in my office today. [Please see my note below.] : Please see my note below. [Consult Closing:] : Thank you very much for allowing me to participate in the care of this patient.  If you have any questions, please do not hesitate to contact me. [Dear  ___] : Dear  [unfilled], [FreeTextEntry2] : [facility]\par  General Internal Medicine\par  [address] [place] [address]\[place] [address]\par   [FreeTextEntry1] : Melissa was seen for her annual follow up visit in the [facility] at the [facility].  [FreeTextEntry3] : \par  SHELLEY Anderson\par  Family Nurse Practitioner \par  [facility] \par  Pediatric Hematology/Oncology\par  [facility]\par  [phone]\par  [email or web address] \par  \par    \par  \par

## 2024-06-10 NOTE — HISTORY OF PRESENT ILLNESS
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  She was treated as per protocol POG 9406 with chemotherapy. Melissa completed therapy in 2000, and is now 24 years off-therapy.  Melissa's post treatment course has been complicated by:  1. history of anxiety and depression; saw a therapist in the past   Since her last visit in the survivorship program, MELISSA has been well without any persistent fevers, weight loss or night sweats. Melissa has 2 biological daughters who are almost 2 and 4 years old.  Melissa has temporarily stopped seeing a therapist, due to her busy schedule taking care of her children.  She maintains good energy levels, denies recurrent fevers, recent infectious illnesses, bruising, bleeding, unintended weight loss, night sweats, new concerning swelling or masses, changes in skin lesions, and any other signs or symptoms suggestive of new or recurrent malignant disease. No recent hospitalizations or ED visits. No other physical concerns reported.  MELISSA has been living at home with her family, her parents, boyfriend Vikas and her 2 daughters, and her sister and her sister's family. 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 She has been going to PT for complaints of neck pain as she sits at a desk most of the day. She performs little formal exercise and eats a well rounded diet.   Melissa denies any psycho social stressors at today's visit.   [de-identified] : Teniposide: Yes\par  Doxorubicin equivalent dose: 90 mg/m2 [de-identified] : Yes [de-identified] : 180 mg/m2 [de-identified] : Yes [de-identified] : Yes [de-identified] : Yes [de-identified] : Yes [de-identified] : Yes [de-identified] : Yes [de-identified] : Yes [de-identified] : Yes [de-identified] : Yes

## 2024-06-10 NOTE — SOCIAL HISTORY
[Mother] : mother [Father] : father [Spouse/Partner] : spouse/partner [Sexually Active] : sexually active [Number of Partners ___] : with [unfilled]  partner(s)  [de-identified] : 2 daughters [FreeTextEntry1] : completed some college, did not graduate

## 2024-06-10 NOTE — PHYSICAL EXAM
[Cervical Lymph Nodes Enlarged Posterior Bilaterally] : posterior cervical [Supraclavicular Lymph Nodes Enlarged Bilaterally] : supraclavicular [Cervical Lymph Nodes Enlarged Anterior Bilaterally] : anterior cervical [No focal deficits] : no focal deficits [Gait normal] : gait normal [Normal] : affect appropriate [100: Normal, no complaints, no evidence of disease.] : 100: Normal, no complaints, no evidence of disease. [de-identified] : right and left upper chest mediport scars intact

## 2024-06-24 ENCOUNTER — APPOINTMENT (OUTPATIENT)
Dept: INTERNAL MEDICINE | Facility: CLINIC | Age: 34
End: 2024-06-24
Payer: MEDICAID

## 2024-06-24 VITALS
BODY MASS INDEX: 28.53 KG/M2 | HEIGHT: 63 IN | DIASTOLIC BLOOD PRESSURE: 68 MMHG | RESPIRATION RATE: 16 BRPM | HEART RATE: 96 BPM | OXYGEN SATURATION: 98 % | SYSTOLIC BLOOD PRESSURE: 124 MMHG | WEIGHT: 161 LBS

## 2024-06-24 DIAGNOSIS — Z00.00 ENCOUNTER FOR GENERAL ADULT MEDICAL EXAMINATION W/OUT ABNORMAL FINDINGS: ICD-10-CM

## 2024-06-24 DIAGNOSIS — E66.9 OBESITY, UNSPECIFIED: ICD-10-CM

## 2024-06-24 DIAGNOSIS — K21.9 GASTRO-ESOPHAGEAL REFLUX DISEASE W/OUT ESOPHAGITIS: ICD-10-CM

## 2024-06-24 DIAGNOSIS — R10.12 LEFT UPPER QUADRANT PAIN: ICD-10-CM

## 2024-06-24 DIAGNOSIS — E55.9 VITAMIN D DEFICIENCY, UNSPECIFIED: ICD-10-CM

## 2024-06-24 DIAGNOSIS — K76.0 FATTY (CHANGE OF) LIVER, NOT ELSEWHERE CLASSIFIED: ICD-10-CM

## 2024-06-24 DIAGNOSIS — E78.1 PURE HYPERGLYCERIDEMIA: ICD-10-CM

## 2024-06-24 DIAGNOSIS — Z85.6 PERSONAL HISTORY OF LEUKEMIA: ICD-10-CM

## 2024-06-24 PROCEDURE — G2211 COMPLEX E/M VISIT ADD ON: CPT | Mod: NC

## 2024-06-24 PROCEDURE — 99214 OFFICE O/P EST MOD 30 MIN: CPT | Mod: 25

## 2024-06-25 LAB
ESTIMATED AVERAGE GLUCOSE: 85 MG/DL
HBA1C MFR BLD HPLC: 4.6 %
TSH SERPL-ACNC: 0.68 UIU/ML

## 2024-07-01 ENCOUNTER — APPOINTMENT (OUTPATIENT)
Dept: ULTRASOUND IMAGING | Facility: CLINIC | Age: 34
End: 2024-07-01
Payer: MEDICAID

## 2024-07-01 PROCEDURE — 76700 US EXAM ABDOM COMPLETE: CPT

## 2024-07-03 ENCOUNTER — APPOINTMENT (OUTPATIENT)
Dept: INTERNAL MEDICINE | Facility: CLINIC | Age: 34
End: 2024-07-03

## 2024-07-24 ENCOUNTER — APPOINTMENT (OUTPATIENT)
Dept: GASTROENTEROLOGY | Facility: CLINIC | Age: 34
End: 2024-07-24
Payer: MEDICAID

## 2024-07-24 VITALS
WEIGHT: 160 LBS | SYSTOLIC BLOOD PRESSURE: 90 MMHG | HEART RATE: 63 BPM | DIASTOLIC BLOOD PRESSURE: 60 MMHG | HEIGHT: 63 IN | BODY MASS INDEX: 28.35 KG/M2

## 2024-07-24 DIAGNOSIS — E66.9 OBESITY, UNSPECIFIED: ICD-10-CM

## 2024-07-24 DIAGNOSIS — R14.0 ABDOMINAL DISTENSION (GASEOUS): ICD-10-CM

## 2024-07-24 DIAGNOSIS — E66.3 OVERWEIGHT: ICD-10-CM

## 2024-07-24 DIAGNOSIS — K76.0 FATTY (CHANGE OF) LIVER, NOT ELSEWHERE CLASSIFIED: ICD-10-CM

## 2024-07-24 DIAGNOSIS — R10.12 LEFT UPPER QUADRANT PAIN: ICD-10-CM

## 2024-07-24 PROCEDURE — 99214 OFFICE O/P EST MOD 30 MIN: CPT | Mod: 25

## 2024-07-24 PROCEDURE — 82272 OCCULT BLD FECES 1-3 TESTS: CPT

## 2024-07-24 RX ORDER — ESOMEPRAZOLE MAGNESIUM 20 MG/1
20 CAPSULE, DELAYED RELEASE ORAL
Refills: 0 | Status: ACTIVE | COMMUNITY

## 2024-07-24 RX ORDER — NORETHINDRONE 0.35 MG/1
0.35 TABLET ORAL
Refills: 0 | Status: ACTIVE | COMMUNITY

## 2024-07-24 NOTE — PHYSICAL EXAM
[Alert] : alert [Normal Voice/Communication] : normal voice/communication [No Acute Distress] : no acute distress [Well Developed] : well developed [Well Nourished] : well nourished [None] : no edema [Bowel Sounds] : normal bowel sounds [No Masses] : no abdominal mass palpated [Abdomen Soft] : soft [] : no hepatosplenomegaly [No Hernia] : no hernia [Normal Sphincter Tone] : normal sphincter tone [No External Hemorrhoid] : no external hemorrhoids [Occult Blood] : negative occult blood [Inguinal Lymph Nodes Enlarged Bilaterally] : no inguinal lymphadenopathy [Normal Color / Pigmentation] : normal skin color and pigmentation [Normal] : oriented to person, place, and time [de-identified] : moderately overweight [FreeTextEntry1] : glasses [de-identified] : mild epigastric tenderness

## 2024-07-24 NOTE — REASON FOR VISIT
[Follow-up] : a follow-up of an existing diagnosis [FreeTextEntry1] : abdominal pain and gas and heart burn

## 2024-07-24 NOTE — ASSESSMENT
[FreeTextEntry1] : 1.  Recent upper abdominal pain, gassiness, heartburn; gastritis at prior EGDs--rule out Helicobacter pylori.  Possible dyspepsia, IBS.  Relieved by defecation--symptoms suggest IBS-C. 2.  ALL diagnosed 1997, status postchemotherapy, in remission. 3.  Overweight. 4.  History of fatty liver, now with negative ultrasound and normal liver chemistries.   5.  Hyperlipidemia. 6.  History of anxiety/depression. 7.  DJD of spine. 8.  Hypovitaminosis D. 9.  Allergic to Timentin.  Plan: 1.  Interim medical records have been reviewed. 2.  Submit stool for Helicobacter pylori antigen.  If positive, treat. 3.  Can increase Nexium to as much as 40 mg AC twice daily as needed. 4.  If symptoms persist despite above, suggest repeat EGD--Procedure, rationale, and anesthesia plan were reviewed and brochure given.

## 2024-07-24 NOTE — HISTORY OF PRESENT ILLNESS
[FreeTextEntry1] : Melissa was diagnosed with ALL 1997, status post chemotherapy, in remission.  Recently, she has been experiencing frequent LUQ and epigastric discomfort with excessive bloating, burping and some flatulence.  She tried Nexium/24HR with initial improvement, but then it stopped working.  Repeat abdominal ultrasound 6/1/2024 was normal (negative for stones or fatty liver).  Prior EGDs, last performed March 2018, revealed gastric erythema.

## 2024-07-24 NOTE — REVIEW OF SYSTEMS
[Loss Of Hearing] : hearing loss [Abdominal Pain] : abdominal pain [Heartburn] : heartburn [Emotional Problems] : emotional problems [Negative] : Heme/Lymph [As Noted in HPI] : as noted in HPI [Bloating (gassiness)] : bloating

## 2024-07-24 NOTE — CONSULT LETTER
[Dear  ___] : Dear  [unfilled], [Courtesy Letter:] : I had the pleasure of seeing your patient, [unfilled], in my office today. [Please see my note below.] : Please see my note below. [Consult Closing:] : Thank you very much for allowing me to participate in the care of this patient.  If you have any questions, please do not hesitate to contact me. [Sincerely,] : Sincerely, [FreeTextEntry3] : Anjel Pillai M.D.

## 2024-07-29 ENCOUNTER — APPOINTMENT (OUTPATIENT)
Dept: UROLOGY | Facility: CLINIC | Age: 34
End: 2024-07-29

## 2024-08-07 ENCOUNTER — APPOINTMENT (OUTPATIENT)
Dept: CARDIOLOGY | Facility: CLINIC | Age: 34
End: 2024-08-07

## 2024-10-09 ENCOUNTER — APPOINTMENT (OUTPATIENT)
Dept: INTERNAL MEDICINE | Facility: CLINIC | Age: 34
End: 2024-10-09
Payer: MEDICAID

## 2024-10-09 ENCOUNTER — LABORATORY RESULT (OUTPATIENT)
Age: 34
End: 2024-10-09

## 2024-10-09 VITALS
DIASTOLIC BLOOD PRESSURE: 68 MMHG | HEART RATE: 83 BPM | WEIGHT: 164 LBS | SYSTOLIC BLOOD PRESSURE: 100 MMHG | BODY MASS INDEX: 29.05 KG/M2 | OXYGEN SATURATION: 98 % | RESPIRATION RATE: 16 BRPM

## 2024-10-09 VITALS
OXYGEN SATURATION: 98 % | SYSTOLIC BLOOD PRESSURE: 100 MMHG | DIASTOLIC BLOOD PRESSURE: 68 MMHG | WEIGHT: 164 LBS | RESPIRATION RATE: 16 BRPM | BODY MASS INDEX: 29.06 KG/M2 | HEIGHT: 63 IN | HEART RATE: 83 BPM

## 2024-10-09 DIAGNOSIS — R10.12 LEFT UPPER QUADRANT PAIN: ICD-10-CM

## 2024-10-09 DIAGNOSIS — Z85.6 PERSONAL HISTORY OF LEUKEMIA: ICD-10-CM

## 2024-10-09 DIAGNOSIS — E66.3 OVERWEIGHT: ICD-10-CM

## 2024-10-09 DIAGNOSIS — R14.0 ABDOMINAL DISTENSION (GASEOUS): ICD-10-CM

## 2024-10-09 DIAGNOSIS — Z23 ENCOUNTER FOR IMMUNIZATION: ICD-10-CM

## 2024-10-09 DIAGNOSIS — E55.9 VITAMIN D DEFICIENCY, UNSPECIFIED: ICD-10-CM

## 2024-10-09 DIAGNOSIS — K21.9 GASTRO-ESOPHAGEAL REFLUX DISEASE W/OUT ESOPHAGITIS: ICD-10-CM

## 2024-10-09 DIAGNOSIS — E78.1 PURE HYPERGLYCERIDEMIA: ICD-10-CM

## 2024-10-09 PROCEDURE — G0008: CPT

## 2024-10-09 PROCEDURE — 99214 OFFICE O/P EST MOD 30 MIN: CPT | Mod: 25

## 2024-10-09 PROCEDURE — 90656 IIV3 VACC NO PRSV 0.5 ML IM: CPT

## 2024-10-10 LAB
25(OH)D3 SERPL-MCNC: 19.8 NG/ML
ALBUMIN SERPL ELPH-MCNC: 4.6 G/DL
ALP BLD-CCNC: 71 U/L
ALT SERPL-CCNC: 8 U/L
ANION GAP SERPL CALC-SCNC: 13 MMOL/L
AST SERPL-CCNC: 15 U/L
BASOPHILS # BLD AUTO: 0.03 K/UL
BASOPHILS NFR BLD AUTO: 0.5 %
BILIRUB SERPL-MCNC: 0.5 MG/DL
BUN SERPL-MCNC: 14 MG/DL
CALCIUM SERPL-MCNC: 9.7 MG/DL
CHLORIDE SERPL-SCNC: 104 MMOL/L
CHOLEST SERPL-MCNC: 123 MG/DL
CO2 SERPL-SCNC: 23 MMOL/L
CREAT SERPL-MCNC: 0.59 MG/DL
EGFR: 123 ML/MIN/1.73M2
EOSINOPHIL # BLD AUTO: 0.08 K/UL
EOSINOPHIL NFR BLD AUTO: 1.4 %
GLUCOSE SERPL-MCNC: 90 MG/DL
HCT VFR BLD CALC: 41.9 %
HDLC SERPL-MCNC: 52 MG/DL
HGB BLD-MCNC: 12.7 G/DL
IMM GRANULOCYTES NFR BLD AUTO: 0.2 %
LDLC SERPL CALC-MCNC: 57 MG/DL
LYMPHOCYTES # BLD AUTO: 1.42 K/UL
LYMPHOCYTES NFR BLD AUTO: 24.6 %
MAN DIFF?: NORMAL
MCHC RBC-ENTMCNC: 25.2 PG
MCHC RBC-ENTMCNC: 30.3 GM/DL
MCV RBC AUTO: 83.1 FL
MONOCYTES # BLD AUTO: 0.34 K/UL
MONOCYTES NFR BLD AUTO: 5.9 %
NEUTROPHILS # BLD AUTO: 3.9 K/UL
NEUTROPHILS NFR BLD AUTO: 67.4 %
NONHDLC SERPL-MCNC: 71 MG/DL
PLATELET # BLD AUTO: 305 K/UL
POTASSIUM SERPL-SCNC: 4.6 MMOL/L
PROT SERPL-MCNC: 7.5 G/DL
RBC # BLD: 5.04 M/UL
RBC # FLD: 20.8 %
SODIUM SERPL-SCNC: 140 MMOL/L
TRIGL SERPL-MCNC: 70 MG/DL
WBC # FLD AUTO: 5.78 K/UL

## 2024-11-27 ENCOUNTER — APPOINTMENT (OUTPATIENT)
Dept: INTERNAL MEDICINE | Facility: CLINIC | Age: 34
End: 2024-11-27
Payer: MEDICAID

## 2024-11-27 VITALS
HEIGHT: 63 IN | BODY MASS INDEX: 29.41 KG/M2 | DIASTOLIC BLOOD PRESSURE: 78 MMHG | WEIGHT: 166 LBS | HEART RATE: 96 BPM | SYSTOLIC BLOOD PRESSURE: 106 MMHG | OXYGEN SATURATION: 100 % | RESPIRATION RATE: 16 BRPM

## 2024-11-27 DIAGNOSIS — F41.9 ANXIETY DISORDER, UNSPECIFIED: ICD-10-CM

## 2024-11-27 DIAGNOSIS — R14.0 ABDOMINAL DISTENSION (GASEOUS): ICD-10-CM

## 2024-11-27 DIAGNOSIS — K21.9 GASTRO-ESOPHAGEAL REFLUX DISEASE W/OUT ESOPHAGITIS: ICD-10-CM

## 2024-11-27 DIAGNOSIS — E66.3 OVERWEIGHT: ICD-10-CM

## 2024-11-27 PROCEDURE — 99213 OFFICE O/P EST LOW 20 MIN: CPT

## 2024-11-27 PROCEDURE — G2211 COMPLEX E/M VISIT ADD ON: CPT | Mod: NC

## 2024-12-18 ENCOUNTER — NON-APPOINTMENT (OUTPATIENT)
Age: 34
End: 2024-12-18

## 2025-01-07 ENCOUNTER — NON-APPOINTMENT (OUTPATIENT)
Age: 35
End: 2025-01-07

## 2025-02-12 ENCOUNTER — APPOINTMENT (OUTPATIENT)
Dept: GASTROENTEROLOGY | Facility: CLINIC | Age: 35
End: 2025-02-12

## 2025-02-26 ENCOUNTER — NON-APPOINTMENT (OUTPATIENT)
Age: 35
End: 2025-02-26

## 2025-03-12 ENCOUNTER — APPOINTMENT (OUTPATIENT)
Dept: DERMATOLOGY | Facility: CLINIC | Age: 35
End: 2025-03-12

## 2025-04-09 ENCOUNTER — APPOINTMENT (OUTPATIENT)
Dept: INTERNAL MEDICINE | Facility: CLINIC | Age: 35
End: 2025-04-09

## 2025-05-12 ENCOUNTER — NON-APPOINTMENT (OUTPATIENT)
Age: 35
End: 2025-05-12

## 2025-05-12 ENCOUNTER — APPOINTMENT (OUTPATIENT)
Dept: GASTROENTEROLOGY | Facility: CLINIC | Age: 35
End: 2025-05-12
Payer: MEDICAID

## 2025-05-12 VITALS
HEART RATE: 84 BPM | DIASTOLIC BLOOD PRESSURE: 66 MMHG | SYSTOLIC BLOOD PRESSURE: 98 MMHG | WEIGHT: 168 LBS | HEIGHT: 63 IN | BODY MASS INDEX: 29.77 KG/M2

## 2025-05-12 DIAGNOSIS — R07.2 PRECORDIAL PAIN: ICD-10-CM

## 2025-05-12 DIAGNOSIS — K21.9 GASTRO-ESOPHAGEAL REFLUX DISEASE W/OUT ESOPHAGITIS: ICD-10-CM

## 2025-05-12 PROCEDURE — G2211 COMPLEX E/M VISIT ADD ON: CPT | Mod: NC

## 2025-05-12 PROCEDURE — 99214 OFFICE O/P EST MOD 30 MIN: CPT

## 2025-05-12 RX ORDER — OMEGA-3/DHA/EPA/FISH OIL 1200 MG
1200 CAPSULE ORAL
Refills: 0 | Status: ACTIVE | COMMUNITY
Start: 2025-05-12

## 2025-05-14 PROBLEM — R07.2 SUBSTERNAL PAIN: Status: ACTIVE | Noted: 2025-05-14

## 2025-05-16 ENCOUNTER — APPOINTMENT (OUTPATIENT)
Dept: DERMATOLOGY | Facility: CLINIC | Age: 35
End: 2025-05-16
Payer: MEDICAID

## 2025-05-16 DIAGNOSIS — L70.0 ACNE VULGARIS: ICD-10-CM

## 2025-05-16 DIAGNOSIS — L21.9 SEBORRHEIC DERMATITIS, UNSPECIFIED: ICD-10-CM

## 2025-05-16 PROCEDURE — 99214 OFFICE O/P EST MOD 30 MIN: CPT

## 2025-05-16 RX ORDER — TRETINOIN 0.25 MG/G
0.03 CREAM TOPICAL
Qty: 1 | Refills: 6 | Status: ACTIVE | COMMUNITY
Start: 2025-05-16 | End: 1900-01-01

## 2025-05-16 RX ORDER — CLINDAMYCIN PHOSPHATE 10 MG/ML
1 LOTION TOPICAL
Qty: 1 | Refills: 11 | Status: ACTIVE | COMMUNITY
Start: 2025-05-16 | End: 1900-01-01

## 2025-06-17 ENCOUNTER — NON-APPOINTMENT (OUTPATIENT)
Age: 35
End: 2025-06-17

## 2025-07-21 ENCOUNTER — APPOINTMENT (OUTPATIENT)
Dept: PEDIATRIC HEMATOLOGY/ONCOLOGY | Facility: CLINIC | Age: 35
End: 2025-07-21
Payer: MEDICAID

## 2025-07-21 VITALS
HEART RATE: 87 BPM | OXYGEN SATURATION: 97 % | TEMPERATURE: 98.3 F | HEIGHT: 63 IN | WEIGHT: 168 LBS | DIASTOLIC BLOOD PRESSURE: 67 MMHG | SYSTOLIC BLOOD PRESSURE: 101 MMHG | BODY MASS INDEX: 29.77 KG/M2

## 2025-07-21 DIAGNOSIS — Z08 ENCOUNTER FOR FOLLOW-UP EXAMINATION AFTER COMPLETED TREATMENT FOR MALIGNANT NEOPLASM: ICD-10-CM

## 2025-07-21 DIAGNOSIS — Z91.89 OTHER SPECIFIED PERSONAL RISK FACTORS, NOT ELSEWHERE CLASSIFIED: ICD-10-CM

## 2025-07-21 DIAGNOSIS — Z92.21 PERSONAL HISTORY OF ANTINEOPLASTIC CHEMOTHERAPY: ICD-10-CM

## 2025-07-21 DIAGNOSIS — Z85.6 PERSONAL HISTORY OF LEUKEMIA: ICD-10-CM

## 2025-07-21 DIAGNOSIS — K58.9 IRRITABLE BOWEL SYNDROME, UNSPECIFIED: ICD-10-CM

## 2025-07-21 PROCEDURE — G2211 COMPLEX E/M VISIT ADD ON: CPT | Mod: NC

## 2025-07-21 PROCEDURE — 99205 OFFICE O/P NEW HI 60 MIN: CPT

## 2025-07-21 PROCEDURE — 99417 PROLNG OP E/M EACH 15 MIN: CPT

## 2025-08-05 ENCOUNTER — APPOINTMENT (OUTPATIENT)
Dept: DERMATOLOGY | Facility: CLINIC | Age: 35
End: 2025-08-05